# Patient Record
Sex: FEMALE | Race: BLACK OR AFRICAN AMERICAN | HISPANIC OR LATINO
[De-identification: names, ages, dates, MRNs, and addresses within clinical notes are randomized per-mention and may not be internally consistent; named-entity substitution may affect disease eponyms.]

---

## 2017-01-06 ENCOUNTER — OTHER (OUTPATIENT)
Age: 32
End: 2017-01-06

## 2017-01-06 ENCOUNTER — APPOINTMENT (OUTPATIENT)
Dept: OBGYN | Facility: CLINIC | Age: 32
End: 2017-01-06

## 2017-01-06 VITALS
SYSTOLIC BLOOD PRESSURE: 116 MMHG | WEIGHT: 194 LBS | BODY MASS INDEX: 32.32 KG/M2 | DIASTOLIC BLOOD PRESSURE: 77 MMHG | HEART RATE: 84 BPM | HEIGHT: 65 IN

## 2017-01-06 DIAGNOSIS — Z34.82 ENCOUNTER FOR SUPERVISION OF OTHER NORMAL PREGNANCY, SECOND TRIMESTER: ICD-10-CM

## 2017-01-06 DIAGNOSIS — Z34.80 ENCOUNTER FOR SUPERVISION OF OTHER NORMAL PREGNANCY, UNSPECIFIED TRIMESTER: ICD-10-CM

## 2017-01-09 ENCOUNTER — APPOINTMENT (OUTPATIENT)
Dept: OBGYN | Facility: CLINIC | Age: 32
End: 2017-01-09

## 2017-01-18 ENCOUNTER — OTHER (OUTPATIENT)
Age: 32
End: 2017-01-18

## 2017-01-18 DIAGNOSIS — A59.01 TRICHOMONAL VULVOVAGINITIS: ICD-10-CM

## 2017-01-18 DIAGNOSIS — Z30.9 ENCOUNTER FOR CONTRACEPTIVE MANAGEMENT, UNSPECIFIED: ICD-10-CM

## 2017-01-18 LAB
C TRACH DNA SPEC QL NAA+PROBE: NORMAL
C TRACH RRNA SPEC QL NAA+PROBE: NORMAL
CANDIDA VAG CYTO: NOT DETECTED
G VAGINALIS+PREV SP MTYP VAG QL MICRO: DETECTED
N GONORRHOEA DNA SPEC QL NAA+PROBE: NORMAL
N GONORRHOEA RRNA SPEC QL NAA+PROBE: NORMAL
SOURCE AMPLIFICATION: NORMAL
T VAGINALIS VAG QL WET PREP: DETECTED

## 2017-01-24 ENCOUNTER — TRANSCRIPTION ENCOUNTER (OUTPATIENT)
Age: 32
End: 2017-01-24

## 2017-01-26 ENCOUNTER — TRANSCRIPTION ENCOUNTER (OUTPATIENT)
Age: 32
End: 2017-01-26

## 2017-01-27 ENCOUNTER — APPOINTMENT (OUTPATIENT)
Dept: OBGYN | Facility: CLINIC | Age: 32
End: 2017-01-27

## 2017-01-27 ENCOUNTER — TRANSCRIPTION ENCOUNTER (OUTPATIENT)
Age: 32
End: 2017-01-27

## 2017-03-01 ENCOUNTER — APPOINTMENT (OUTPATIENT)
Dept: OBGYN | Facility: CLINIC | Age: 32
End: 2017-03-01

## 2017-03-08 ENCOUNTER — APPOINTMENT (OUTPATIENT)
Age: 32
End: 2017-03-08

## 2017-10-30 ENCOUNTER — EMERGENCY (EMERGENCY)
Facility: HOSPITAL | Age: 32
LOS: 1 days | Discharge: HOME | End: 2017-10-30
Attending: EMERGENCY MEDICINE | Admitting: EMERGENCY MEDICINE
Payer: SELF-PAY

## 2017-10-30 VITALS
SYSTOLIC BLOOD PRESSURE: 119 MMHG | HEART RATE: 66 BPM | RESPIRATION RATE: 20 BRPM | TEMPERATURE: 99 F | OXYGEN SATURATION: 99 % | DIASTOLIC BLOOD PRESSURE: 83 MMHG

## 2017-10-30 VITALS — OXYGEN SATURATION: 100 % | DIASTOLIC BLOOD PRESSURE: 63 MMHG | HEART RATE: 64 BPM | SYSTOLIC BLOOD PRESSURE: 108 MMHG

## 2017-10-30 DIAGNOSIS — Z98.890 OTHER SPECIFIED POSTPROCEDURAL STATES: Chronic | ICD-10-CM

## 2017-10-30 DIAGNOSIS — O34.219 MATERNAL CARE FOR UNSPECIFIED TYPE SCAR FROM PREVIOUS CESAREAN DELIVERY: Chronic | ICD-10-CM

## 2017-10-30 LAB
ALBUMIN SERPL ELPH-MCNC: 4.1 G/DL — SIGNIFICANT CHANGE UP (ref 3.3–5)
ALP SERPL-CCNC: 79 U/L — SIGNIFICANT CHANGE UP (ref 40–120)
ALT FLD-CCNC: 18 U/L — SIGNIFICANT CHANGE UP (ref 4–33)
APPEARANCE UR: CLEAR — SIGNIFICANT CHANGE UP
AST SERPL-CCNC: 16 U/L — SIGNIFICANT CHANGE UP (ref 4–32)
BASOPHILS # BLD AUTO: 0.03 K/UL — SIGNIFICANT CHANGE UP (ref 0–0.2)
BASOPHILS NFR BLD AUTO: 0.4 % — SIGNIFICANT CHANGE UP (ref 0–2)
BILIRUB SERPL-MCNC: 0.3 MG/DL — SIGNIFICANT CHANGE UP (ref 0.2–1.2)
BILIRUB UR-MCNC: NEGATIVE — SIGNIFICANT CHANGE UP
BLOOD UR QL VISUAL: NEGATIVE — SIGNIFICANT CHANGE UP
BUN SERPL-MCNC: 11 MG/DL — SIGNIFICANT CHANGE UP (ref 7–23)
CALCIUM SERPL-MCNC: 8.9 MG/DL — SIGNIFICANT CHANGE UP (ref 8.4–10.5)
CHLORIDE SERPL-SCNC: 104 MMOL/L — SIGNIFICANT CHANGE UP (ref 98–107)
CO2 SERPL-SCNC: 25 MMOL/L — SIGNIFICANT CHANGE UP (ref 22–31)
COLOR SPEC: SIGNIFICANT CHANGE UP
CREAT SERPL-MCNC: 0.94 MG/DL — SIGNIFICANT CHANGE UP (ref 0.5–1.3)
EOSINOPHIL # BLD AUTO: 0.05 K/UL — SIGNIFICANT CHANGE UP (ref 0–0.5)
EOSINOPHIL NFR BLD AUTO: 0.6 % — SIGNIFICANT CHANGE UP (ref 0–6)
GLUCOSE SERPL-MCNC: 104 MG/DL — HIGH (ref 70–99)
GLUCOSE UR-MCNC: NEGATIVE — SIGNIFICANT CHANGE UP
HCT VFR BLD CALC: 36.7 % — SIGNIFICANT CHANGE UP (ref 34.5–45)
HGB BLD-MCNC: 12 G/DL — SIGNIFICANT CHANGE UP (ref 11.5–15.5)
IMM GRANULOCYTES # BLD AUTO: 0.03 # — SIGNIFICANT CHANGE UP
IMM GRANULOCYTES NFR BLD AUTO: 0.4 % — SIGNIFICANT CHANGE UP (ref 0–1.5)
KETONES UR-MCNC: NEGATIVE — SIGNIFICANT CHANGE UP
LEUKOCYTE ESTERASE UR-ACNC: HIGH
LIDOCAIN IGE QN: 22 U/L — SIGNIFICANT CHANGE UP (ref 7–60)
LYMPHOCYTES # BLD AUTO: 1.24 K/UL — SIGNIFICANT CHANGE UP (ref 1–3.3)
LYMPHOCYTES # BLD AUTO: 14.6 % — SIGNIFICANT CHANGE UP (ref 13–44)
MCHC RBC-ENTMCNC: 29.6 PG — SIGNIFICANT CHANGE UP (ref 27–34)
MCHC RBC-ENTMCNC: 32.7 % — SIGNIFICANT CHANGE UP (ref 32–36)
MCV RBC AUTO: 90.4 FL — SIGNIFICANT CHANGE UP (ref 80–100)
MONOCYTES # BLD AUTO: 0.35 K/UL — SIGNIFICANT CHANGE UP (ref 0–0.9)
MONOCYTES NFR BLD AUTO: 4.1 % — SIGNIFICANT CHANGE UP (ref 2–14)
MUCOUS THREADS # UR AUTO: SIGNIFICANT CHANGE UP
NEUTROPHILS # BLD AUTO: 6.77 K/UL — SIGNIFICANT CHANGE UP (ref 1.8–7.4)
NEUTROPHILS NFR BLD AUTO: 79.9 % — HIGH (ref 43–77)
NITRITE UR-MCNC: NEGATIVE — SIGNIFICANT CHANGE UP
NRBC # FLD: 0 — SIGNIFICANT CHANGE UP
PH UR: 8 — SIGNIFICANT CHANGE UP (ref 4.6–8)
PLATELET # BLD AUTO: 202 K/UL — SIGNIFICANT CHANGE UP (ref 150–400)
PMV BLD: 10.4 FL — SIGNIFICANT CHANGE UP (ref 7–13)
POTASSIUM SERPL-MCNC: 4.5 MMOL/L — SIGNIFICANT CHANGE UP (ref 3.5–5.3)
POTASSIUM SERPL-SCNC: 4.5 MMOL/L — SIGNIFICANT CHANGE UP (ref 3.5–5.3)
PROT SERPL-MCNC: 7.5 G/DL — SIGNIFICANT CHANGE UP (ref 6–8.3)
PROT UR-MCNC: NEGATIVE — SIGNIFICANT CHANGE UP
RBC # BLD: 4.06 M/UL — SIGNIFICANT CHANGE UP (ref 3.8–5.2)
RBC # FLD: 12.2 % — SIGNIFICANT CHANGE UP (ref 10.3–14.5)
RBC CASTS # UR COMP ASSIST: SIGNIFICANT CHANGE UP (ref 0–?)
SODIUM SERPL-SCNC: 139 MMOL/L — SIGNIFICANT CHANGE UP (ref 135–145)
SP GR SPEC: 1.02 — SIGNIFICANT CHANGE UP (ref 1–1.03)
SQUAMOUS # UR AUTO: SIGNIFICANT CHANGE UP
UROBILINOGEN FLD QL: NORMAL E.U. — SIGNIFICANT CHANGE UP (ref 0.1–0.2)
WBC # BLD: 8.47 K/UL — SIGNIFICANT CHANGE UP (ref 3.8–10.5)
WBC # FLD AUTO: 8.47 K/UL — SIGNIFICANT CHANGE UP (ref 3.8–10.5)
WBC UR QL: HIGH (ref 0–?)

## 2017-10-30 PROCEDURE — 99284 EMERGENCY DEPT VISIT MOD MDM: CPT

## 2017-10-30 RX ORDER — ONDANSETRON 8 MG/1
4 TABLET, FILM COATED ORAL ONCE
Qty: 0 | Refills: 0 | Status: COMPLETED | OUTPATIENT
Start: 2017-10-30 | End: 2017-10-30

## 2017-10-30 RX ORDER — FAMOTIDINE 10 MG/ML
20 INJECTION INTRAVENOUS ONCE
Qty: 0 | Refills: 0 | Status: COMPLETED | OUTPATIENT
Start: 2017-10-30 | End: 2017-10-30

## 2017-10-30 RX ORDER — SODIUM CHLORIDE 9 MG/ML
2000 INJECTION INTRAMUSCULAR; INTRAVENOUS; SUBCUTANEOUS ONCE
Qty: 0 | Refills: 0 | Status: COMPLETED | OUTPATIENT
Start: 2017-10-30 | End: 2017-10-30

## 2017-10-30 RX ORDER — ACETAMINOPHEN 500 MG
650 TABLET ORAL ONCE
Qty: 0 | Refills: 0 | Status: COMPLETED | OUTPATIENT
Start: 2017-10-30 | End: 2017-10-30

## 2017-10-30 RX ADMIN — SODIUM CHLORIDE 2000 MILLILITER(S): 9 INJECTION INTRAMUSCULAR; INTRAVENOUS; SUBCUTANEOUS at 10:00

## 2017-10-30 RX ADMIN — FAMOTIDINE 20 MILLIGRAM(S): 10 INJECTION INTRAVENOUS at 09:47

## 2017-10-30 RX ADMIN — ONDANSETRON 4 MILLIGRAM(S): 8 TABLET, FILM COATED ORAL at 09:47

## 2017-10-30 RX ADMIN — Medication 650 MILLIGRAM(S): at 10:00

## 2017-10-30 NOTE — ED ADULT TRIAGE NOTE - CHIEF COMPLAINT QUOTE
pt states "I have sharp pain across my abdomen it started suddenly this morning" c/o feeling nauseous

## 2017-10-30 NOTE — ED PROVIDER NOTE - OBJECTIVE STATEMENT
31 y/o F w/ no significant PMhx and PSHx of , presents to the ED c/o infraumbilical pain since 2:30am w/ diffuse abd cramping associated w/ nausea. No vomiting. Pt also reports 1 episode of watery diarrhea. Pain is unrelated to meals. Pt states she ate chinese food last night, but daughter had same food and is asymptomatic. Denies fever, chills, dysuria, hematuria, recent travel, sick contacts, recent antibiotic use or any other complaints.

## 2017-10-30 NOTE — ED PROVIDER NOTE - MEDICAL DECISION MAKING DETAILS
33 y/o F w/ sx likely secondary to chinese food intake yesterday. Will order labs, IV fluids, repeat abd exam and likely dc home w/ APPY instructions.

## 2017-10-31 LAB — SPECIMEN SOURCE: SIGNIFICANT CHANGE UP

## 2017-11-01 LAB
-  AMIKACIN: SIGNIFICANT CHANGE UP
-  AMPICILLIN/SULBACTAM: SIGNIFICANT CHANGE UP
-  AMPICILLIN: SIGNIFICANT CHANGE UP
-  AZTREONAM: SIGNIFICANT CHANGE UP
-  CEFAZOLIN: SIGNIFICANT CHANGE UP
-  CEFEPIME: SIGNIFICANT CHANGE UP
-  CEFOXITIN: SIGNIFICANT CHANGE UP
-  CEFTAZIDIME: SIGNIFICANT CHANGE UP
-  CEFTRIAXONE: SIGNIFICANT CHANGE UP
-  CIPROFLOXACIN: SIGNIFICANT CHANGE UP
-  ERTAPENEM: SIGNIFICANT CHANGE UP
-  GENTAMICIN: SIGNIFICANT CHANGE UP
-  IMIPENEM: SIGNIFICANT CHANGE UP
-  LEVOFLOXACIN: SIGNIFICANT CHANGE UP
-  MEROPENEM: SIGNIFICANT CHANGE UP
-  NITROFURANTOIN: SIGNIFICANT CHANGE UP
-  PIPERACILLIN/TAZOBACTAM: SIGNIFICANT CHANGE UP
-  TIGECYCLINE: SIGNIFICANT CHANGE UP
-  TOBRAMYCIN: SIGNIFICANT CHANGE UP
-  TRIMETHOPRIM/SULFAMETHOXAZOLE: SIGNIFICANT CHANGE UP
BACTERIA UR CULT: SIGNIFICANT CHANGE UP
METHOD TYPE: SIGNIFICANT CHANGE UP
ORGANISM # SPEC MICROSCOPIC CNT: SIGNIFICANT CHANGE UP
ORGANISM # SPEC MICROSCOPIC CNT: SIGNIFICANT CHANGE UP

## 2017-11-01 NOTE — ED POST DISCHARGE NOTE - RESULT SUMMARY
UCX: 1. E, coli > 100,000 prelim. UCX 2. Gram neg rods 10,000-49,000CFU/ml prelim. No antibiotic listed in ED provider note or prescription writer at time of discharge. Admin P.A. to follow results to final.

## 2017-11-02 RX ORDER — NITROFURANTOIN MACROCRYSTAL 50 MG
1 CAPSULE ORAL
Qty: 14 | Refills: 0 | OUTPATIENT
Start: 2017-11-02 | End: 2017-11-09

## 2018-10-16 ENCOUNTER — APPOINTMENT (OUTPATIENT)
Dept: SURGERY | Facility: CLINIC | Age: 33
End: 2018-10-16
Payer: COMMERCIAL

## 2018-10-16 VITALS
TEMPERATURE: 98.4 F | OXYGEN SATURATION: 98 % | BODY MASS INDEX: 31.65 KG/M2 | SYSTOLIC BLOOD PRESSURE: 132 MMHG | DIASTOLIC BLOOD PRESSURE: 76 MMHG | HEIGHT: 65 IN | HEART RATE: 70 BPM | WEIGHT: 190 LBS

## 2018-10-16 PROCEDURE — 99204 OFFICE O/P NEW MOD 45 MIN: CPT

## 2018-11-05 ENCOUNTER — OUTPATIENT (OUTPATIENT)
Dept: OUTPATIENT SERVICES | Facility: HOSPITAL | Age: 33
LOS: 1 days | End: 2018-11-05
Payer: COMMERCIAL

## 2018-11-05 VITALS
HEART RATE: 77 BPM | DIASTOLIC BLOOD PRESSURE: 67 MMHG | WEIGHT: 195.11 LBS | HEIGHT: 65 IN | TEMPERATURE: 99 F | OXYGEN SATURATION: 98 % | RESPIRATION RATE: 18 BRPM | SYSTOLIC BLOOD PRESSURE: 105 MMHG

## 2018-11-05 DIAGNOSIS — K43.2 INCISIONAL HERNIA WITHOUT OBSTRUCTION OR GANGRENE: ICD-10-CM

## 2018-11-05 DIAGNOSIS — Z98.890 OTHER SPECIFIED POSTPROCEDURAL STATES: Chronic | ICD-10-CM

## 2018-11-05 DIAGNOSIS — O34.219 MATERNAL CARE FOR UNSPECIFIED TYPE SCAR FROM PREVIOUS CESAREAN DELIVERY: Chronic | ICD-10-CM

## 2018-11-05 DIAGNOSIS — L98.9 DISORDER OF THE SKIN AND SUBCUTANEOUS TISSUE, UNSPECIFIED: ICD-10-CM

## 2018-11-05 DIAGNOSIS — Z01.818 ENCOUNTER FOR OTHER PREPROCEDURAL EXAMINATION: ICD-10-CM

## 2018-11-05 LAB
ANION GAP SERPL CALC-SCNC: 6 MMOL/L — SIGNIFICANT CHANGE UP (ref 5–17)
APTT BLD: 30.4 SEC — SIGNIFICANT CHANGE UP (ref 27.5–36.3)
BUN SERPL-MCNC: 10 MG/DL — SIGNIFICANT CHANGE UP (ref 7–18)
CALCIUM SERPL-MCNC: 8.9 MG/DL — SIGNIFICANT CHANGE UP (ref 8.4–10.5)
CHLORIDE SERPL-SCNC: 106 MMOL/L — SIGNIFICANT CHANGE UP (ref 96–108)
CO2 SERPL-SCNC: 24 MMOL/L — SIGNIFICANT CHANGE UP (ref 22–31)
CREAT SERPL-MCNC: 0.85 MG/DL — SIGNIFICANT CHANGE UP (ref 0.5–1.3)
GLUCOSE SERPL-MCNC: 95 MG/DL — SIGNIFICANT CHANGE UP (ref 70–99)
HCG SERPL-ACNC: <1 MIU/ML — SIGNIFICANT CHANGE UP
HCT VFR BLD CALC: 37.8 % — SIGNIFICANT CHANGE UP (ref 34.5–45)
HGB BLD-MCNC: 12.4 G/DL — SIGNIFICANT CHANGE UP (ref 11.5–15.5)
INR BLD: 1.08 RATIO — SIGNIFICANT CHANGE UP (ref 0.88–1.16)
MCHC RBC-ENTMCNC: 29.1 PG — SIGNIFICANT CHANGE UP (ref 27–34)
MCHC RBC-ENTMCNC: 32.7 GM/DL — SIGNIFICANT CHANGE UP (ref 32–36)
MCV RBC AUTO: 89 FL — SIGNIFICANT CHANGE UP (ref 80–100)
PLATELET # BLD AUTO: 217 K/UL — SIGNIFICANT CHANGE UP (ref 150–400)
POTASSIUM SERPL-MCNC: 3.9 MMOL/L — SIGNIFICANT CHANGE UP (ref 3.5–5.3)
POTASSIUM SERPL-SCNC: 3.9 MMOL/L — SIGNIFICANT CHANGE UP (ref 3.5–5.3)
PROTHROM AB SERPL-ACNC: 12 SEC — SIGNIFICANT CHANGE UP (ref 10–12.9)
RBC # BLD: 4.25 M/UL — SIGNIFICANT CHANGE UP (ref 3.8–5.2)
RBC # FLD: 12.4 % — SIGNIFICANT CHANGE UP (ref 10.3–14.5)
SODIUM SERPL-SCNC: 136 MMOL/L — SIGNIFICANT CHANGE UP (ref 135–145)
WBC # BLD: 6.5 K/UL — SIGNIFICANT CHANGE UP (ref 3.8–10.5)
WBC # FLD AUTO: 6.5 K/UL — SIGNIFICANT CHANGE UP (ref 3.8–10.5)

## 2018-11-05 PROCEDURE — G0463: CPT

## 2018-11-05 PROCEDURE — 85730 THROMBOPLASTIN TIME PARTIAL: CPT

## 2018-11-05 PROCEDURE — 86901 BLOOD TYPING SEROLOGIC RH(D): CPT

## 2018-11-05 PROCEDURE — 86850 RBC ANTIBODY SCREEN: CPT

## 2018-11-05 PROCEDURE — 86900 BLOOD TYPING SEROLOGIC ABO: CPT

## 2018-11-05 PROCEDURE — 80048 BASIC METABOLIC PNL TOTAL CA: CPT

## 2018-11-05 PROCEDURE — 85610 PROTHROMBIN TIME: CPT

## 2018-11-05 PROCEDURE — 84702 CHORIONIC GONADOTROPIN TEST: CPT

## 2018-11-05 PROCEDURE — 85027 COMPLETE CBC AUTOMATED: CPT

## 2018-11-05 NOTE — H&P PST ADULT - HISTORY OF PRESENT ILLNESS
33year old female with pmhx of acid reflux, incisional hernia and constipation presents today with c/o abdominal pain x weeks. Patient is here today for presurgical testing for scheduled repair of incisional hernia and removal of excess skin on 11/8/18

## 2018-11-05 NOTE — H&P PST ADULT - NSANTHOSAYNRD_GEN_A_CORE
No. KENYON screening performed.  STOP BANG Legend: 0-2 = LOW Risk; 3-4 = INTERMEDIATE Risk; 5-8 = HIGH Risk

## 2018-11-05 NOTE — H&P PST ADULT - NEGATIVE FEMALE-SPECIFIC SYMPTOMS
no dysmenorrhea/no amenorrhea/no abnormal vaginal bleeding/no pelvic pain/no irregular menses/no vaginal discharge/no menorrhagia/no spotting

## 2018-11-13 PROBLEM — E66.9 OBESITY, UNSPECIFIED: Chronic | Status: ACTIVE | Noted: 2018-11-05

## 2018-11-13 PROBLEM — K42.9 UMBILICAL HERNIA WITHOUT OBSTRUCTION OR GANGRENE: Chronic | Status: ACTIVE | Noted: 2018-11-05

## 2018-11-22 ENCOUNTER — TRANSCRIPTION ENCOUNTER (OUTPATIENT)
Age: 33
End: 2018-11-22

## 2018-11-23 ENCOUNTER — OUTPATIENT (OUTPATIENT)
Dept: OUTPATIENT SERVICES | Facility: HOSPITAL | Age: 33
LOS: 1 days | End: 2018-11-23
Payer: COMMERCIAL

## 2018-11-23 ENCOUNTER — RESULT REVIEW (OUTPATIENT)
Age: 33
End: 2018-11-23

## 2018-11-23 VITALS
OXYGEN SATURATION: 98 % | HEART RATE: 78 BPM | RESPIRATION RATE: 16 BRPM | HEIGHT: 65 IN | SYSTOLIC BLOOD PRESSURE: 107 MMHG | TEMPERATURE: 98 F | WEIGHT: 195.11 LBS | DIASTOLIC BLOOD PRESSURE: 71 MMHG

## 2018-11-23 VITALS
SYSTOLIC BLOOD PRESSURE: 106 MMHG | OXYGEN SATURATION: 97 % | RESPIRATION RATE: 14 BRPM | HEART RATE: 90 BPM | DIASTOLIC BLOOD PRESSURE: 75 MMHG | TEMPERATURE: 98 F

## 2018-11-23 DIAGNOSIS — Z98.890 OTHER SPECIFIED POSTPROCEDURAL STATES: Chronic | ICD-10-CM

## 2018-11-23 DIAGNOSIS — K43.2 INCISIONAL HERNIA WITHOUT OBSTRUCTION OR GANGRENE: ICD-10-CM

## 2018-11-23 DIAGNOSIS — O34.219 MATERNAL CARE FOR UNSPECIFIED TYPE SCAR FROM PREVIOUS CESAREAN DELIVERY: Chronic | ICD-10-CM

## 2018-11-23 DIAGNOSIS — L98.9 DISORDER OF THE SKIN AND SUBCUTANEOUS TISSUE, UNSPECIFIED: ICD-10-CM

## 2018-11-23 DIAGNOSIS — Z01.818 ENCOUNTER FOR OTHER PREPROCEDURAL EXAMINATION: ICD-10-CM

## 2018-11-23 LAB — HCG UR QL: NEGATIVE — SIGNIFICANT CHANGE UP

## 2018-11-23 PROCEDURE — 49560: CPT

## 2018-11-23 PROCEDURE — 86900 BLOOD TYPING SEROLOGIC ABO: CPT

## 2018-11-23 PROCEDURE — 81025 URINE PREGNANCY TEST: CPT

## 2018-11-23 PROCEDURE — 49560: CPT | Mod: AS

## 2018-11-23 PROCEDURE — 88302 TISSUE EXAM BY PATHOLOGIST: CPT

## 2018-11-23 PROCEDURE — 15830 EXC EXCESSIVE SKIN ABDOMEN: CPT | Mod: AS,59

## 2018-11-23 PROCEDURE — 15830 EXC EXCESSIVE SKIN ABDOMEN: CPT | Mod: XS

## 2018-11-23 PROCEDURE — 88302 TISSUE EXAM BY PATHOLOGIST: CPT | Mod: 26

## 2018-11-23 PROCEDURE — 15830 EXC EXCESSIVE SKIN ABDOMEN: CPT | Mod: 59

## 2018-11-23 PROCEDURE — 86901 BLOOD TYPING SEROLOGIC RH(D): CPT

## 2018-11-23 PROCEDURE — 86850 RBC ANTIBODY SCREEN: CPT

## 2018-11-23 RX ORDER — HYDROMORPHONE HYDROCHLORIDE 2 MG/ML
0.5 INJECTION INTRAMUSCULAR; INTRAVENOUS; SUBCUTANEOUS
Qty: 0 | Refills: 0 | Status: DISCONTINUED | OUTPATIENT
Start: 2018-11-23 | End: 2018-11-23

## 2018-11-23 RX ORDER — OXYCODONE AND ACETAMINOPHEN 5; 325 MG/1; MG/1
1 TABLET ORAL EVERY 6 HOURS
Qty: 0 | Refills: 0 | Status: DISCONTINUED | OUTPATIENT
Start: 2018-11-23 | End: 2018-11-23

## 2018-11-23 RX ORDER — SODIUM CHLORIDE 9 MG/ML
3 INJECTION INTRAMUSCULAR; INTRAVENOUS; SUBCUTANEOUS EVERY 8 HOURS
Qty: 0 | Refills: 0 | Status: DISCONTINUED | OUTPATIENT
Start: 2018-11-23 | End: 2018-11-23

## 2018-11-23 RX ORDER — SODIUM CHLORIDE 9 MG/ML
1000 INJECTION, SOLUTION INTRAVENOUS
Qty: 0 | Refills: 0 | Status: DISCONTINUED | OUTPATIENT
Start: 2018-11-23 | End: 2018-12-01

## 2018-11-23 RX ORDER — SODIUM CHLORIDE 9 MG/ML
1000 INJECTION, SOLUTION INTRAVENOUS
Qty: 0 | Refills: 0 | Status: DISCONTINUED | OUTPATIENT
Start: 2018-11-23 | End: 2018-11-26

## 2018-11-23 RX ADMIN — SODIUM CHLORIDE 3 MILLILITER(S): 9 INJECTION INTRAMUSCULAR; INTRAVENOUS; SUBCUTANEOUS at 09:08

## 2018-11-23 RX ADMIN — HYDROMORPHONE HYDROCHLORIDE 0.5 MILLIGRAM(S): 2 INJECTION INTRAMUSCULAR; INTRAVENOUS; SUBCUTANEOUS at 16:52

## 2018-11-23 RX ADMIN — HYDROMORPHONE HYDROCHLORIDE 0.5 MILLIGRAM(S): 2 INJECTION INTRAMUSCULAR; INTRAVENOUS; SUBCUTANEOUS at 17:23

## 2018-11-23 RX ADMIN — HYDROMORPHONE HYDROCHLORIDE 0.5 MILLIGRAM(S): 2 INJECTION INTRAMUSCULAR; INTRAVENOUS; SUBCUTANEOUS at 16:47

## 2018-11-23 NOTE — ASU DISCHARGE PLAN (ADULT/PEDIATRIC). - MEDICATION SUMMARY - MEDICATIONS TO TAKE
I will START or STAY ON the medications listed below when I get home from the hospital:    oxyCODONE-acetaminophen 5 mg-325 mg oral tablet  -- 1 tab(s) by mouth every 6 hours, As needed, Moderate Pain (4 - 6) MDD:4  -- Indication: For pain

## 2018-11-23 NOTE — BRIEF OPERATIVE NOTE - PROCEDURE
<<-----Click on this checkbox to enter Procedure Surgical removal of excess skin of abdomen  11/23/2018    Active  BA  Herniorrhaphy, incisional  11/23/2018    Active  MOZGA

## 2018-11-23 NOTE — BRIEF OPERATIVE NOTE - POST-OP DX
Disorder of skin or subcutaneous tissue  11/23/2018    Diamond Hernandez  Incisional hernia without obstruction or gangrene  11/23/2018    Active  Diamond Ray

## 2018-11-26 PROBLEM — Z82.49 FAMILY HISTORY OF HYPERTENSION: Status: ACTIVE | Noted: 2018-10-16

## 2018-11-26 PROBLEM — Z83.3 FAMILY HISTORY OF DIABETES MELLITUS: Status: ACTIVE | Noted: 2018-10-16

## 2018-11-26 PROBLEM — Z78.9 SOCIAL ALCOHOL USE: Status: ACTIVE | Noted: 2018-10-16

## 2018-11-27 ENCOUNTER — APPOINTMENT (OUTPATIENT)
Dept: SURGERY | Facility: CLINIC | Age: 33
End: 2018-11-27
Payer: COMMERCIAL

## 2018-11-27 DIAGNOSIS — Z82.49 FAMILY HISTORY OF ISCHEMIC HEART DISEASE AND OTHER DISEASES OF THE CIRCULATORY SYSTEM: ICD-10-CM

## 2018-11-27 DIAGNOSIS — Z83.3 FAMILY HISTORY OF DIABETES MELLITUS: ICD-10-CM

## 2018-11-27 DIAGNOSIS — Z78.9 OTHER SPECIFIED HEALTH STATUS: ICD-10-CM

## 2018-11-27 PROCEDURE — 99024 POSTOP FOLLOW-UP VISIT: CPT

## 2018-11-29 LAB — SURGICAL PATHOLOGY STUDY: SIGNIFICANT CHANGE UP

## 2018-12-04 DIAGNOSIS — G89.18 OTHER ACUTE POSTPROCEDURAL PAIN: ICD-10-CM

## 2018-12-11 ENCOUNTER — APPOINTMENT (OUTPATIENT)
Dept: SURGERY | Facility: CLINIC | Age: 33
End: 2018-12-11
Payer: COMMERCIAL

## 2018-12-11 DIAGNOSIS — K42.9 UMBILICAL HERNIA W/OUT OBSTRUCTION OR GANGRENE: ICD-10-CM

## 2018-12-11 PROCEDURE — 99024 POSTOP FOLLOW-UP VISIT: CPT

## 2019-01-08 ENCOUNTER — APPOINTMENT (OUTPATIENT)
Dept: SURGERY | Facility: CLINIC | Age: 34
End: 2019-01-08
Payer: COMMERCIAL

## 2019-01-08 DIAGNOSIS — K43.2 INCISIONAL HERNIA W/OUT OBSTRUCTION OR GANGRENE: ICD-10-CM

## 2019-01-08 PROCEDURE — 99024 POSTOP FOLLOW-UP VISIT: CPT

## 2019-03-10 ENCOUNTER — EMERGENCY (EMERGENCY)
Facility: HOSPITAL | Age: 34
LOS: 1 days | Discharge: HOME | End: 2019-03-10
Attending: EMERGENCY MEDICINE | Admitting: EMERGENCY MEDICINE
Payer: COMMERCIAL

## 2019-03-10 VITALS
SYSTOLIC BLOOD PRESSURE: 116 MMHG | DIASTOLIC BLOOD PRESSURE: 76 MMHG | RESPIRATION RATE: 16 BRPM | HEART RATE: 81 BPM | OXYGEN SATURATION: 100 % | TEMPERATURE: 98 F

## 2019-03-10 DIAGNOSIS — O34.219 MATERNAL CARE FOR UNSPECIFIED TYPE SCAR FROM PREVIOUS CESAREAN DELIVERY: Chronic | ICD-10-CM

## 2019-03-10 DIAGNOSIS — Z98.890 OTHER SPECIFIED POSTPROCEDURAL STATES: Chronic | ICD-10-CM

## 2019-03-10 LAB
BASOPHILS # BLD AUTO: 0.03 K/UL — SIGNIFICANT CHANGE UP (ref 0–0.2)
BASOPHILS NFR BLD AUTO: 0.3 % — SIGNIFICANT CHANGE UP (ref 0–2)
EOSINOPHIL # BLD AUTO: 0.17 K/UL — SIGNIFICANT CHANGE UP (ref 0–0.5)
EOSINOPHIL NFR BLD AUTO: 1.8 % — SIGNIFICANT CHANGE UP (ref 0–6)
HCT VFR BLD CALC: 36.3 % — SIGNIFICANT CHANGE UP (ref 34.5–45)
HGB BLD-MCNC: 11.6 G/DL — SIGNIFICANT CHANGE UP (ref 11.5–15.5)
IMM GRANULOCYTES NFR BLD AUTO: 0.2 % — SIGNIFICANT CHANGE UP (ref 0–1.5)
LYMPHOCYTES # BLD AUTO: 2.84 K/UL — SIGNIFICANT CHANGE UP (ref 1–3.3)
LYMPHOCYTES # BLD AUTO: 30 % — SIGNIFICANT CHANGE UP (ref 13–44)
MCHC RBC-ENTMCNC: 28.3 PG — SIGNIFICANT CHANGE UP (ref 27–34)
MCHC RBC-ENTMCNC: 32 % — SIGNIFICANT CHANGE UP (ref 32–36)
MCV RBC AUTO: 88.5 FL — SIGNIFICANT CHANGE UP (ref 80–100)
MONOCYTES # BLD AUTO: 0.64 K/UL — SIGNIFICANT CHANGE UP (ref 0–0.9)
MONOCYTES NFR BLD AUTO: 6.8 % — SIGNIFICANT CHANGE UP (ref 2–14)
NEUTROPHILS # BLD AUTO: 5.76 K/UL — SIGNIFICANT CHANGE UP (ref 1.8–7.4)
NEUTROPHILS NFR BLD AUTO: 60.9 % — SIGNIFICANT CHANGE UP (ref 43–77)
NRBC # FLD: 0 K/UL — LOW (ref 25–125)
OB PNL STL: POSITIVE — SIGNIFICANT CHANGE UP
PLATELET # BLD AUTO: 217 K/UL — SIGNIFICANT CHANGE UP (ref 150–400)
PMV BLD: 10.3 FL — SIGNIFICANT CHANGE UP (ref 7–13)
RBC # BLD: 4.1 M/UL — SIGNIFICANT CHANGE UP (ref 3.8–5.2)
RBC # FLD: 13.2 % — SIGNIFICANT CHANGE UP (ref 10.3–14.5)
WBC # BLD: 9.46 K/UL — SIGNIFICANT CHANGE UP (ref 3.8–10.5)
WBC # FLD AUTO: 9.46 K/UL — SIGNIFICANT CHANGE UP (ref 3.8–10.5)

## 2019-03-10 PROCEDURE — 99283 EMERGENCY DEPT VISIT LOW MDM: CPT

## 2019-03-10 NOTE — ED PROVIDER NOTE - OBJECTIVE STATEMENT
32 yo with no sig PMH with 3 weeks of intermittent blood in stools.  No pain.  Described as a lot of blood in the toilet but the stool is reportedly brown.  No constipation.  Does have a history of hemorrhoids but this feels different.  Has felt tired over the last week but no SOB and not lightheaded.

## 2019-03-10 NOTE — ED PROVIDER NOTE - CLINICAL SUMMARY MEDICAL DECISION MAKING FREE TEXT BOX
pt with rectal bleeding. Based on exam unlikely to be melena.  Will get CBC to ensure not anemic.  Will transfuse if needed and refer to GI otherwise

## 2019-03-10 NOTE — ED PROVIDER NOTE - NSFOLLOWUPINSTRUCTIONS_ED_ALL_ED_FT
please follow up with a GI doctor from the attached list this week.  If your bleeding gets worse or you feel weaker or lightheaded or short of breath return to the ED sooner

## 2019-03-10 NOTE — ED PROVIDER NOTE - PHYSICAL EXAMINATION
Gen: Well appearing in NAD  Head: NC/AT  Neck: trachea midline  Resp:  No distress  Abd: soft NT ND - rectal with brown stool and some scant blood  Ext: no deformities  Neuro:  A&O appears non focal  Skin:  Warm and dry as visualized

## 2019-03-10 NOTE — ED ADULT TRIAGE NOTE - CHIEF COMPLAINT QUOTE
pt comes to ED for blood in stool x 2 weeks. pt denies being on blood thinners or needing a blood transfusion. pt was told to come to ER by PCP. pt VSS pt denies any PMHX

## 2019-04-03 ENCOUNTER — APPOINTMENT (OUTPATIENT)
Dept: GASTROENTEROLOGY | Facility: CLINIC | Age: 34
End: 2019-04-03
Payer: COMMERCIAL

## 2019-04-03 VITALS
HEIGHT: 65 IN | SYSTOLIC BLOOD PRESSURE: 120 MMHG | WEIGHT: 197 LBS | OXYGEN SATURATION: 98 % | HEART RATE: 75 BPM | BODY MASS INDEX: 32.82 KG/M2 | DIASTOLIC BLOOD PRESSURE: 80 MMHG | TEMPERATURE: 98.6 F

## 2019-04-03 DIAGNOSIS — B18.1 CHRONIC VIRAL HEPATITIS B W/OUT DELTA-AGENT: ICD-10-CM

## 2019-04-03 DIAGNOSIS — K62.5 HEMORRHAGE OF ANUS AND RECTUM: ICD-10-CM

## 2019-04-03 PROCEDURE — 99204 OFFICE O/P NEW MOD 45 MIN: CPT

## 2019-04-03 RX ORDER — IBUPROFEN 600 MG/1
600 TABLET, FILM COATED ORAL EVERY 6 HOURS
Qty: 120 | Refills: 0 | Status: DISCONTINUED | COMMUNITY
Start: 2018-12-04 | End: 2019-04-03

## 2019-04-03 RX ORDER — HYDROCORTISONE 25 MG/G
2.5 CREAM TOPICAL TWICE DAILY
Qty: 1 | Refills: 2 | Status: ACTIVE | COMMUNITY
Start: 2019-04-03 | End: 1900-01-01

## 2019-04-03 RX ORDER — METRONIDAZOLE 500 MG/1
500 TABLET ORAL
Qty: 4 | Refills: 0 | Status: DISCONTINUED | COMMUNITY
Start: 2017-01-18 | End: 2019-04-03

## 2019-04-03 RX ORDER — SODIUM SULFATE, POTASSIUM SULFATE, MAGNESIUM SULFATE 17.5; 3.13; 1.6 G/ML; G/ML; G/ML
17.5-3.13-1.6 SOLUTION, CONCENTRATE ORAL
Qty: 1 | Refills: 0 | Status: ACTIVE | COMMUNITY
Start: 2019-04-03 | End: 1900-01-01

## 2019-04-03 NOTE — ASSESSMENT
[FreeTextEntry1] : Patient with rectal bleeding likely secondary to internal hemorrhoids. She will be scheduled for colonoscopy. A hemorrhoidal cream will be sent to pharmacy.

## 2019-04-03 NOTE — HISTORY OF PRESENT ILLNESS
[FreeTextEntry1] : Patient is a 33-year-old female with two-month history of bright red blood per rectum. The blood is in the bowl, on the paper, and in the stool. She does complain of occasional constipation and moves her bowels about every other day. She was seen in the emergency room on 3/10. H./H. was 11.6/36.3, MCV 88, occult blood in the stool was positive.\par Patient has had these symptoms for the past 2 months and has noted the bleeding daily.\par She is status post incisional hernia repair in November.\par \par She is a chronic hepatitis B carrier with low levels of virus. She is on no therapy. LFTs and sonograms have been negative.

## 2019-05-02 ENCOUNTER — APPOINTMENT (OUTPATIENT)
Dept: GASTROENTEROLOGY | Facility: AMBULATORY MEDICAL SERVICES | Age: 34
End: 2019-05-02
Payer: COMMERCIAL

## 2019-05-02 PROCEDURE — 45378 DIAGNOSTIC COLONOSCOPY: CPT

## 2019-05-20 ENCOUNTER — EMERGENCY (EMERGENCY)
Facility: HOSPITAL | Age: 34
LOS: 1 days | Discharge: HOME | End: 2019-05-20
Admitting: EMERGENCY MEDICINE
Payer: COMMERCIAL

## 2019-05-20 VITALS
OXYGEN SATURATION: 98 % | RESPIRATION RATE: 17 BRPM | SYSTOLIC BLOOD PRESSURE: 104 MMHG | DIASTOLIC BLOOD PRESSURE: 66 MMHG | TEMPERATURE: 99 F | HEART RATE: 103 BPM

## 2019-05-20 VITALS
DIASTOLIC BLOOD PRESSURE: 62 MMHG | RESPIRATION RATE: 16 BRPM | HEART RATE: 84 BPM | SYSTOLIC BLOOD PRESSURE: 102 MMHG | OXYGEN SATURATION: 100 %

## 2019-05-20 DIAGNOSIS — O34.219 MATERNAL CARE FOR UNSPECIFIED TYPE SCAR FROM PREVIOUS CESAREAN DELIVERY: Chronic | ICD-10-CM

## 2019-05-20 DIAGNOSIS — Z98.890 OTHER SPECIFIED POSTPROCEDURAL STATES: Chronic | ICD-10-CM

## 2019-05-20 LAB
ALBUMIN SERPL ELPH-MCNC: 4.2 G/DL — SIGNIFICANT CHANGE UP (ref 3.3–5)
ALP SERPL-CCNC: 69 U/L — SIGNIFICANT CHANGE UP (ref 40–120)
ALT FLD-CCNC: 34 U/L — HIGH (ref 4–33)
ANION GAP SERPL CALC-SCNC: 13 MMO/L — SIGNIFICANT CHANGE UP (ref 7–14)
ANISOCYTOSIS BLD QL: SLIGHT — SIGNIFICANT CHANGE UP
AST SERPL-CCNC: 37 U/L — HIGH (ref 4–32)
BASOPHILS # BLD AUTO: 0.01 K/UL — SIGNIFICANT CHANGE UP (ref 0–0.2)
BASOPHILS NFR BLD AUTO: 0.5 % — SIGNIFICANT CHANGE UP (ref 0–2)
BASOPHILS NFR SPEC: 0.9 % — SIGNIFICANT CHANGE UP (ref 0–2)
BILIRUB SERPL-MCNC: 0.2 MG/DL — SIGNIFICANT CHANGE UP (ref 0.2–1.2)
BLASTS # FLD: 0 % — SIGNIFICANT CHANGE UP (ref 0–0)
BUN SERPL-MCNC: 12 MG/DL — SIGNIFICANT CHANGE UP (ref 7–23)
CALCIUM SERPL-MCNC: 9.4 MG/DL — SIGNIFICANT CHANGE UP (ref 8.4–10.5)
CHLORIDE SERPL-SCNC: 104 MMOL/L — SIGNIFICANT CHANGE UP (ref 98–107)
CO2 SERPL-SCNC: 23 MMOL/L — SIGNIFICANT CHANGE UP (ref 22–31)
CREAT SERPL-MCNC: 1.27 MG/DL — SIGNIFICANT CHANGE UP (ref 0.5–1.3)
EOSINOPHIL # BLD AUTO: 0.01 K/UL — SIGNIFICANT CHANGE UP (ref 0–0.5)
EOSINOPHIL NFR BLD AUTO: 0.5 % — SIGNIFICANT CHANGE UP (ref 0–6)
EOSINOPHIL NFR FLD: 0.9 % — SIGNIFICANT CHANGE UP (ref 0–6)
GIANT PLATELETS BLD QL SMEAR: PRESENT — SIGNIFICANT CHANGE UP
GLUCOSE SERPL-MCNC: 85 MG/DL — SIGNIFICANT CHANGE UP (ref 70–99)
HCT VFR BLD CALC: 39.9 % — SIGNIFICANT CHANGE UP (ref 34.5–45)
HGB BLD-MCNC: 12.5 G/DL — SIGNIFICANT CHANGE UP (ref 11.5–15.5)
IMM GRANULOCYTES NFR BLD AUTO: 0 % — SIGNIFICANT CHANGE UP (ref 0–1.5)
LIDOCAIN IGE QN: 26.4 U/L — SIGNIFICANT CHANGE UP (ref 7–60)
LYMPHOCYTES # BLD AUTO: 0.74 K/UL — LOW (ref 1–3.3)
LYMPHOCYTES # BLD AUTO: 35.7 % — SIGNIFICANT CHANGE UP (ref 13–44)
LYMPHOCYTES NFR SPEC AUTO: 32.4 % — SIGNIFICANT CHANGE UP (ref 13–44)
MCHC RBC-ENTMCNC: 28 PG — SIGNIFICANT CHANGE UP (ref 27–34)
MCHC RBC-ENTMCNC: 31.3 % — LOW (ref 32–36)
MCV RBC AUTO: 89.5 FL — SIGNIFICANT CHANGE UP (ref 80–100)
METAMYELOCYTES # FLD: 0 % — SIGNIFICANT CHANGE UP (ref 0–1)
MONOCYTES # BLD AUTO: 0.42 K/UL — SIGNIFICANT CHANGE UP (ref 0–0.9)
MONOCYTES NFR BLD AUTO: 20.3 % — HIGH (ref 2–14)
MONOCYTES NFR BLD: 15.8 % — HIGH (ref 2–9)
MYELOCYTES NFR BLD: 0 % — SIGNIFICANT CHANGE UP (ref 0–0)
NEUTROPHIL AB SER-ACNC: 43 % — SIGNIFICANT CHANGE UP (ref 43–77)
NEUTROPHILS # BLD AUTO: 0.89 K/UL — LOW (ref 1.8–7.4)
NEUTROPHILS NFR BLD AUTO: 43 % — SIGNIFICANT CHANGE UP (ref 43–77)
NEUTS BAND # BLD: 0 % — SIGNIFICANT CHANGE UP (ref 0–6)
NRBC # FLD: 0 K/UL — SIGNIFICANT CHANGE UP (ref 0–0)
OTHER - HEMATOLOGY %: 0 — SIGNIFICANT CHANGE UP
PLATELET # BLD AUTO: 172 K/UL — SIGNIFICANT CHANGE UP (ref 150–400)
PLATELET COUNT - ESTIMATE: NORMAL — SIGNIFICANT CHANGE UP
PMV BLD: 10.4 FL — SIGNIFICANT CHANGE UP (ref 7–13)
POIKILOCYTOSIS BLD QL AUTO: SLIGHT — SIGNIFICANT CHANGE UP
POLYCHROMASIA BLD QL SMEAR: SLIGHT — SIGNIFICANT CHANGE UP
POTASSIUM SERPL-MCNC: 3.6 MMOL/L — SIGNIFICANT CHANGE UP (ref 3.5–5.3)
POTASSIUM SERPL-SCNC: 3.6 MMOL/L — SIGNIFICANT CHANGE UP (ref 3.5–5.3)
PROMYELOCYTES # FLD: 0 % — SIGNIFICANT CHANGE UP (ref 0–0)
PROT SERPL-MCNC: 7.9 G/DL — SIGNIFICANT CHANGE UP (ref 6–8.3)
RBC # BLD: 4.46 M/UL — SIGNIFICANT CHANGE UP (ref 3.8–5.2)
RBC # FLD: 13.2 % — SIGNIFICANT CHANGE UP (ref 10.3–14.5)
SMUDGE CELLS # BLD: PRESENT — SIGNIFICANT CHANGE UP
SODIUM SERPL-SCNC: 140 MMOL/L — SIGNIFICANT CHANGE UP (ref 135–145)
VARIANT LYMPHS # BLD: 7 % — SIGNIFICANT CHANGE UP
WBC # BLD: 2.07 K/UL — LOW (ref 3.8–10.5)
WBC # FLD AUTO: 2.07 K/UL — LOW (ref 3.8–10.5)

## 2019-05-20 PROCEDURE — 99284 EMERGENCY DEPT VISIT MOD MDM: CPT

## 2019-05-20 RX ORDER — KETOROLAC TROMETHAMINE 30 MG/ML
30 SYRINGE (ML) INJECTION ONCE
Refills: 0 | Status: DISCONTINUED | OUTPATIENT
Start: 2019-05-20 | End: 2019-05-20

## 2019-05-20 RX ORDER — METOCLOPRAMIDE HCL 10 MG
10 TABLET ORAL ONCE
Refills: 0 | Status: COMPLETED | OUTPATIENT
Start: 2019-05-20 | End: 2019-05-20

## 2019-05-20 RX ORDER — SODIUM CHLORIDE 9 MG/ML
1000 INJECTION INTRAMUSCULAR; INTRAVENOUS; SUBCUTANEOUS ONCE
Refills: 0 | Status: COMPLETED | OUTPATIENT
Start: 2019-05-20 | End: 2019-05-20

## 2019-05-20 RX ADMIN — Medication 10 MILLIGRAM(S): at 16:04

## 2019-05-20 RX ADMIN — SODIUM CHLORIDE 2000 MILLILITER(S): 9 INJECTION INTRAMUSCULAR; INTRAVENOUS; SUBCUTANEOUS at 16:04

## 2019-05-20 RX ADMIN — Medication 30 MILLIGRAM(S): at 16:17

## 2019-05-20 NOTE — ED PROVIDER NOTE - CLINICAL SUMMARY MEDICAL DECISION MAKING FREE TEXT BOX
32 y/o female c/o bodyaches fever chills headache weakness/malaise x 4 days  -probable viral syndrome/gastroenteritis  -labs, iv fluids, reglan toradol  -reassess

## 2019-05-20 NOTE — ED PROVIDER NOTE - OBJECTIVE STATEMENT
34 y/o female no PMH presents to ED c/o multiple complaints. Pt. states friday evening (3 days ago) developed chills and fevers followed by nausea and multiple episodes of vomiting - states over past 48 hours has been experiencing worsening bocyaches fevers chills headache and generalized weakness as well as lack of appetite. Pt. Denies diarrhea, blood in vomit, recent sick consults. States has been taking advil and tylenol with minimal relief.

## 2020-11-20 ENCOUNTER — APPOINTMENT (OUTPATIENT)
Dept: OBGYN | Facility: CLINIC | Age: 35
End: 2020-11-20

## 2020-12-27 ENCOUNTER — FORM ENCOUNTER (OUTPATIENT)
Age: 35
End: 2020-12-27

## 2020-12-28 ENCOUNTER — APPOINTMENT (OUTPATIENT)
Dept: OBGYN | Facility: CLINIC | Age: 35
End: 2020-12-28
Payer: COMMERCIAL

## 2020-12-28 PROCEDURE — 99072 ADDL SUPL MATRL&STAF TM PHE: CPT

## 2020-12-28 PROCEDURE — 36415 COLL VENOUS BLD VENIPUNCTURE: CPT

## 2020-12-28 PROCEDURE — 99395 PREV VISIT EST AGE 18-39: CPT

## 2020-12-30 ENCOUNTER — RESULT REVIEW (OUTPATIENT)
Age: 35
End: 2020-12-30

## 2020-12-30 ENCOUNTER — OUTPATIENT (OUTPATIENT)
Dept: OUTPATIENT SERVICES | Facility: HOSPITAL | Age: 35
LOS: 1 days | End: 2020-12-30
Payer: MEDICAID

## 2020-12-30 ENCOUNTER — APPOINTMENT (OUTPATIENT)
Dept: ULTRASOUND IMAGING | Facility: IMAGING CENTER | Age: 35
End: 2020-12-30
Payer: MEDICAID

## 2020-12-30 DIAGNOSIS — N92.0 EXCESSIVE AND FREQUENT MENSTRUATION WITH REGULAR CYCLE: ICD-10-CM

## 2020-12-30 DIAGNOSIS — Z98.890 OTHER SPECIFIED POSTPROCEDURAL STATES: Chronic | ICD-10-CM

## 2020-12-30 DIAGNOSIS — O34.219 MATERNAL CARE FOR UNSPECIFIED TYPE SCAR FROM PREVIOUS CESAREAN DELIVERY: Chronic | ICD-10-CM

## 2020-12-30 DIAGNOSIS — R10.2 PELVIC AND PERINEAL PAIN: ICD-10-CM

## 2020-12-30 PROCEDURE — 76830 TRANSVAGINAL US NON-OB: CPT | Mod: 26

## 2020-12-30 PROCEDURE — 76856 US EXAM PELVIC COMPLETE: CPT | Mod: 26,59

## 2020-12-30 PROCEDURE — 76830 TRANSVAGINAL US NON-OB: CPT

## 2020-12-30 PROCEDURE — 76856 US EXAM PELVIC COMPLETE: CPT

## 2021-01-05 ENCOUNTER — FORM ENCOUNTER (OUTPATIENT)
Age: 36
End: 2021-01-05

## 2021-02-03 ENCOUNTER — APPOINTMENT (OUTPATIENT)
Dept: OBGYN | Facility: CLINIC | Age: 36
End: 2021-02-03
Payer: COMMERCIAL

## 2021-02-03 ENCOUNTER — RESULT REVIEW (OUTPATIENT)
Age: 36
End: 2021-02-03

## 2021-02-03 PROCEDURE — 99213 OFFICE O/P EST LOW 20 MIN: CPT

## 2021-02-03 PROCEDURE — 99072 ADDL SUPL MATRL&STAF TM PHE: CPT

## 2021-02-04 ENCOUNTER — FORM ENCOUNTER (OUTPATIENT)
Age: 36
End: 2021-02-04

## 2021-02-10 ENCOUNTER — FORM ENCOUNTER (OUTPATIENT)
Age: 36
End: 2021-02-10

## 2021-04-01 ENCOUNTER — NON-APPOINTMENT (OUTPATIENT)
Age: 36
End: 2021-04-01

## 2021-04-29 ENCOUNTER — FORM ENCOUNTER (OUTPATIENT)
Age: 36
End: 2021-04-29

## 2021-04-30 ENCOUNTER — APPOINTMENT (OUTPATIENT)
Dept: OBGYN | Facility: CLINIC | Age: 36
End: 2021-04-30
Payer: MEDICAID

## 2021-04-30 PROCEDURE — 99072 ADDL SUPL MATRL&STAF TM PHE: CPT

## 2021-04-30 PROCEDURE — 36415 COLL VENOUS BLD VENIPUNCTURE: CPT

## 2021-04-30 PROCEDURE — 99213 OFFICE O/P EST LOW 20 MIN: CPT | Mod: 25

## 2021-04-30 PROCEDURE — 76817 TRANSVAGINAL US OBSTETRIC: CPT

## 2021-05-18 ENCOUNTER — FORM ENCOUNTER (OUTPATIENT)
Age: 36
End: 2021-05-18

## 2021-05-20 ENCOUNTER — FORM ENCOUNTER (OUTPATIENT)
Age: 36
End: 2021-05-20

## 2021-05-21 ENCOUNTER — APPOINTMENT (OUTPATIENT)
Dept: OBGYN | Facility: CLINIC | Age: 36
End: 2021-05-21
Payer: MEDICAID

## 2021-05-21 PROCEDURE — 76801 OB US < 14 WKS SINGLE FETUS: CPT

## 2021-05-21 PROCEDURE — 76813 OB US NUCHAL MEAS 1 GEST: CPT | Mod: 59

## 2021-05-21 PROCEDURE — 36415 COLL VENOUS BLD VENIPUNCTURE: CPT

## 2021-05-21 PROCEDURE — 0500F INITIAL PRENATAL CARE VISIT: CPT

## 2021-05-24 ENCOUNTER — FORM ENCOUNTER (OUTPATIENT)
Age: 36
End: 2021-05-24

## 2021-05-31 ENCOUNTER — FORM ENCOUNTER (OUTPATIENT)
Age: 36
End: 2021-05-31

## 2021-06-10 ENCOUNTER — FORM ENCOUNTER (OUTPATIENT)
Age: 36
End: 2021-06-10

## 2021-06-16 ENCOUNTER — APPOINTMENT (OUTPATIENT)
Dept: OBGYN | Facility: CLINIC | Age: 36
End: 2021-06-16
Payer: MEDICAID

## 2021-06-16 PROCEDURE — 0502F SUBSEQUENT PRENATAL CARE: CPT

## 2021-06-16 PROCEDURE — 36415 COLL VENOUS BLD VENIPUNCTURE: CPT

## 2021-07-13 ENCOUNTER — APPOINTMENT (OUTPATIENT)
Dept: ANTEPARTUM | Facility: CLINIC | Age: 36
End: 2021-07-13
Payer: MEDICAID

## 2021-07-13 ENCOUNTER — ASOB RESULT (OUTPATIENT)
Age: 36
End: 2021-07-13

## 2021-07-13 PROCEDURE — 76811 OB US DETAILED SNGL FETUS: CPT

## 2021-07-21 ENCOUNTER — ASOB RESULT (OUTPATIENT)
Age: 36
End: 2021-07-21

## 2021-07-21 ENCOUNTER — APPOINTMENT (OUTPATIENT)
Dept: ANTEPARTUM | Facility: CLINIC | Age: 36
End: 2021-07-21
Payer: MEDICAID

## 2021-07-21 PROCEDURE — 76816 OB US FOLLOW-UP PER FETUS: CPT

## 2021-07-22 ENCOUNTER — APPOINTMENT (OUTPATIENT)
Dept: OBGYN | Facility: CLINIC | Age: 36
End: 2021-07-22
Payer: MEDICAID

## 2021-07-22 PROCEDURE — 0502F SUBSEQUENT PRENATAL CARE: CPT

## 2021-08-03 ENCOUNTER — OUTPATIENT (OUTPATIENT)
Dept: OUTPATIENT SERVICES | Facility: HOSPITAL | Age: 36
LOS: 1 days | End: 2021-08-03
Payer: MEDICAID

## 2021-08-03 VITALS — SYSTOLIC BLOOD PRESSURE: 117 MMHG | OXYGEN SATURATION: 96 % | DIASTOLIC BLOOD PRESSURE: 74 MMHG | HEART RATE: 87 BPM

## 2021-08-03 VITALS — HEART RATE: 86 BPM | OXYGEN SATURATION: 95 %

## 2021-08-03 DIAGNOSIS — Z98.890 OTHER SPECIFIED POSTPROCEDURAL STATES: Chronic | ICD-10-CM

## 2021-08-03 DIAGNOSIS — Z3A.00 WEEKS OF GESTATION OF PREGNANCY NOT SPECIFIED: ICD-10-CM

## 2021-08-03 DIAGNOSIS — O26.899 OTHER SPECIFIED PREGNANCY RELATED CONDITIONS, UNSPECIFIED TRIMESTER: ICD-10-CM

## 2021-08-03 DIAGNOSIS — O34.219 MATERNAL CARE FOR UNSPECIFIED TYPE SCAR FROM PREVIOUS CESAREAN DELIVERY: Chronic | ICD-10-CM

## 2021-08-03 PROCEDURE — G0463: CPT

## 2021-08-03 PROCEDURE — 59025 FETAL NON-STRESS TEST: CPT

## 2021-08-03 PROCEDURE — 99214 OFFICE O/P EST MOD 30 MIN: CPT

## 2021-08-03 NOTE — OB PROVIDER TRIAGE NOTE - PSH
Delivery with history of     H/O hernia repair  2011 - umbilical  
Spine appears normal, range of motion is not limited, no muscle or joint tenderness

## 2021-08-03 NOTE — OB PROVIDER TRIAGE NOTE - HISTORY OF PRESENT ILLNESS
OB PA Triage Note    36 yo  @ 23w11 by EDC of  presents with 2 episodes of loss clear fluid. Pt states first episode happened while she was standing- she felt something leaking- rushed to the bathroom and wiped with tissue before her underwear got soiled. the second time was a few hours later while sitting down. No futher wetness or leaking noted    +FM NO CTX/VB    PNC: uncomplicated    PMH: none  Meds: PNV  All: NKA  GYN: denies fibroids/cysts/STI/abn pap  OB:  FT  5mol2es   FT  [id number]  2016 FT primary c/s for placenta previa  PSH: umbilical hernia repair- 2019  c/s x 1    Social: denies toxic habits  Psych: denies history

## 2021-08-03 NOTE — OB PROVIDER TRIAGE NOTE - NSOBPROVIDERNOTE_OBGYN_ALL_OB_FT
A/P: 34 yo P3 @ 23w1d present with loss of fluid- pt ruled out for rupture of membranes  - discharge home with routine prenatal followup  -  labor precautions given  - return if LOF, VB or labor    d/w Dr Subhash Jackson, PAC

## 2021-08-03 NOTE — OB PROVIDER TRIAGE NOTE - NSHPPHYSICALEXAM_GEN_ALL_CORE
ICU Vital Signs Last 24 Hrs  T(C): --  T(F): --  HR: 87 (03 Aug 2021 17:01) (83 - 90)  BP: 112/65 (03 Aug 2021 17:01) (112/65 - 117/74)  BP(mean): --  ABP: --  ABP(mean): --  RR: --  SpO2: 98% (03 Aug 2021 17:01) (93% - 99%)      Gen: NAD  Heart: RRR  Lungs: CTA b/l  Abd: gravid NTND    SSE: no pooling. neg nitrazine fern  VE: long/closed/high    Sono: vertex, MVP 8.78

## 2021-08-17 ENCOUNTER — FORM ENCOUNTER (OUTPATIENT)
Age: 36
End: 2021-08-17

## 2021-08-26 ENCOUNTER — FORM ENCOUNTER (OUTPATIENT)
Age: 36
End: 2021-08-26

## 2021-08-27 ENCOUNTER — APPOINTMENT (OUTPATIENT)
Dept: OBGYN | Facility: CLINIC | Age: 36
End: 2021-08-27
Payer: MEDICAID

## 2021-08-27 PROCEDURE — 0502F SUBSEQUENT PRENATAL CARE: CPT

## 2021-08-27 PROCEDURE — 36415 COLL VENOUS BLD VENIPUNCTURE: CPT

## 2021-08-29 ENCOUNTER — FORM ENCOUNTER (OUTPATIENT)
Age: 36
End: 2021-08-29

## 2021-09-01 ENCOUNTER — APPOINTMENT (OUTPATIENT)
Dept: MATERNAL FETAL MEDICINE | Facility: CLINIC | Age: 36
End: 2021-09-01
Payer: MEDICAID

## 2021-09-01 ENCOUNTER — ASOB RESULT (OUTPATIENT)
Age: 36
End: 2021-09-01

## 2021-09-01 PROCEDURE — G0109 DIAB MANAGE TRN IND/GROUP: CPT | Mod: 95

## 2021-09-02 RX ORDER — BLOOD-GLUCOSE METER
W/DEVICE KIT MISCELLANEOUS
Qty: 1 | Refills: 0 | Status: ACTIVE | COMMUNITY
Start: 2021-09-01 | End: 1900-01-01

## 2021-09-02 RX ORDER — URINE ACETONE TEST STRIPS
STRIP MISCELLANEOUS
Qty: 1 | Refills: 0 | Status: ACTIVE | COMMUNITY
Start: 2021-09-01 | End: 1900-01-01

## 2021-09-02 RX ORDER — LANCETS 33 GAUGE
EACH MISCELLANEOUS
Qty: 1 | Refills: 2 | Status: ACTIVE | COMMUNITY
Start: 2021-09-01 | End: 1900-01-01

## 2021-09-02 RX ORDER — BLOOD-GLUCOSE METER
KIT MISCELLANEOUS 4 TIMES DAILY
Qty: 4 | Refills: 1 | Status: ACTIVE | COMMUNITY
Start: 2021-09-01 | End: 1900-01-01

## 2021-09-16 ENCOUNTER — APPOINTMENT (OUTPATIENT)
Dept: MATERNAL FETAL MEDICINE | Facility: CLINIC | Age: 36
End: 2021-09-16
Payer: MEDICAID

## 2021-09-16 ENCOUNTER — ASOB RESULT (OUTPATIENT)
Age: 36
End: 2021-09-16

## 2021-09-16 PROCEDURE — G0108 DIAB MANAGE TRN  PER INDIV: CPT | Mod: 95

## 2021-09-19 ENCOUNTER — FORM ENCOUNTER (OUTPATIENT)
Age: 36
End: 2021-09-19

## 2021-09-21 ENCOUNTER — FORM ENCOUNTER (OUTPATIENT)
Age: 36
End: 2021-09-21

## 2021-09-22 ENCOUNTER — APPOINTMENT (OUTPATIENT)
Dept: OBGYN | Facility: CLINIC | Age: 36
End: 2021-09-22
Payer: MEDICAID

## 2021-09-22 ENCOUNTER — APPOINTMENT (OUTPATIENT)
Dept: OBGYN | Facility: CLINIC | Age: 36
End: 2021-09-22

## 2021-09-22 VITALS
BODY MASS INDEX: 35.99 KG/M2 | DIASTOLIC BLOOD PRESSURE: 70 MMHG | SYSTOLIC BLOOD PRESSURE: 110 MMHG | HEIGHT: 65 IN | WEIGHT: 216 LBS

## 2021-09-22 PROCEDURE — 90471 IMMUNIZATION ADMIN: CPT

## 2021-09-22 PROCEDURE — 90715 TDAP VACCINE 7 YRS/> IM: CPT

## 2021-09-22 PROCEDURE — 0502F SUBSEQUENT PRENATAL CARE: CPT

## 2021-09-27 DIAGNOSIS — Z86.19 PERSONAL HISTORY OF OTHER INFECTIOUS AND PARASITIC DISEASES: ICD-10-CM

## 2021-09-27 DIAGNOSIS — Z87.42 PERSONAL HISTORY OF OTHER DISEASES OF THE FEMALE GENITAL TRACT: ICD-10-CM

## 2021-09-27 DIAGNOSIS — Z87.59 PERSONAL HISTORY OF OTHER COMPLICATIONS OF PREGNANCY, CHILDBIRTH AND THE PUERPERIUM: ICD-10-CM

## 2021-09-27 DIAGNOSIS — O24.410 GESTATIONAL DIABETES MELLITUS IN PREGNANCY, DIET CONTROLLED: ICD-10-CM

## 2021-09-27 RX ORDER — LACTOBACILLUS ACIDOPHILUS/PECT 30 MG-20MG
TABLET ORAL
Refills: 0 | Status: ACTIVE | COMMUNITY

## 2021-09-27 NOTE — H&P PST ADULT - URINARY CATHETER
Inpatient Admission Authorization Request   NOTIFICATION OF INPATIENT ADMISSION/INPATIENT AUTHORIZATION REQUEST   SERVICING FACILITY:   [address] [place] [address]  Tax ID: [id number]  NPI: [id number]  Place of Service: [facility]patient [facility]  [address]  Place of Service Code: 24     ATTENDING PROVIDER:  Attending Name and NPI#: Lindsay Rhodes, Jae Gunn Fadumo [[id number]]  Address: [address] [place] [address]  Phone: [phone]     UTILIZATION REVIEW CONTACT:  [facility] Utilization   Network Utilization Review Department  Phone: [phone]  Fax [phone]  Email: [email or web address]     PHYSICIAN ADVISORY SERVICES:  FOR ZDFV-SA-VIUD REVIEW - MEDICAL NECESSITY DENIAL  Phone: [phone]  Fax: [phone]  Email: [email or web address]     TYPE OF REQUEST:  Inpatient Status     ADMISSION INFORMATION:  ADMISSION DATE/TIME: 9/25/21  7:24 AM  PATIENT DIAGNOSIS CODE/DESCRIPTION:  Abdominal pain [R10 9]  Generalized abdominal pain [R10 84]  DISCHARGE DATE/TIME: No discharge date for patient encounter  DISCHARGE DISPOSITION (IF DISCHARGED): Non [facility] SNF/TCU/SNU     IMPORTANT INFORMATION:  Please contact the Debbie Lyon directly with any questions or concerns regarding this request  Department voicemails are confidential     Send requests for admission clinical reviews, concurrent reviews, approvals, and administrative denials due to lack of clinical to fax [phone]  no

## 2021-09-28 ENCOUNTER — NON-APPOINTMENT (OUTPATIENT)
Age: 36
End: 2021-09-28

## 2021-09-29 ENCOUNTER — NON-APPOINTMENT (OUTPATIENT)
Age: 36
End: 2021-09-29

## 2021-10-06 ENCOUNTER — FORM ENCOUNTER (OUTPATIENT)
Age: 36
End: 2021-10-06

## 2021-10-08 DIAGNOSIS — Z34.90 ENCOUNTER FOR SUPERVISION OF NORMAL PREGNANCY, UNSPECIFIED, UNSPECIFIED TRIMESTER: ICD-10-CM

## 2021-10-10 ENCOUNTER — FORM ENCOUNTER (OUTPATIENT)
Age: 36
End: 2021-10-10

## 2021-10-11 ENCOUNTER — APPOINTMENT (OUTPATIENT)
Dept: MATERNAL FETAL MEDICINE | Facility: CLINIC | Age: 36
End: 2021-10-11
Payer: MEDICAID

## 2021-10-11 ENCOUNTER — ASOB RESULT (OUTPATIENT)
Age: 36
End: 2021-10-11

## 2021-10-11 ENCOUNTER — APPOINTMENT (OUTPATIENT)
Dept: OBGYN | Facility: CLINIC | Age: 36
End: 2021-10-11
Payer: MEDICAID

## 2021-10-11 VITALS
WEIGHT: 219 LBS | BODY MASS INDEX: 36.49 KG/M2 | HEIGHT: 65 IN | HEART RATE: 91 BPM | SYSTOLIC BLOOD PRESSURE: 107 MMHG | DIASTOLIC BLOOD PRESSURE: 74 MMHG

## 2021-10-11 PROCEDURE — 0502F SUBSEQUENT PRENATAL CARE: CPT

## 2021-10-11 PROCEDURE — 76816 OB US FOLLOW-UP PER FETUS: CPT

## 2021-10-11 PROCEDURE — G0108 DIAB MANAGE TRN  PER INDIV: CPT | Mod: 95

## 2021-10-11 PROCEDURE — 76819 FETAL BIOPHYS PROFIL W/O NST: CPT

## 2021-10-14 ENCOUNTER — NON-APPOINTMENT (OUTPATIENT)
Age: 36
End: 2021-10-14

## 2021-10-14 DIAGNOSIS — Z87.42 PERSONAL HISTORY OF OTHER DISEASES OF THE FEMALE GENITAL TRACT: ICD-10-CM

## 2021-10-14 DIAGNOSIS — Z78.9 OTHER SPECIFIED HEALTH STATUS: ICD-10-CM

## 2021-10-14 DIAGNOSIS — Z87.898 PERSONAL HISTORY OF OTHER SPECIFIED CONDITIONS: ICD-10-CM

## 2021-10-14 DIAGNOSIS — O09.529 SUPERVISION OF ELDERLY MULTIGRAVIDA, UNSPECIFIED TRIMESTER: ICD-10-CM

## 2021-10-14 LAB — CYTOLOGY CVX/VAG DOC THIN PREP: NORMAL

## 2021-10-18 ENCOUNTER — NON-APPOINTMENT (OUTPATIENT)
Age: 36
End: 2021-10-18

## 2021-10-24 ENCOUNTER — FORM ENCOUNTER (OUTPATIENT)
Age: 36
End: 2021-10-24

## 2021-10-25 ENCOUNTER — APPOINTMENT (OUTPATIENT)
Dept: OBGYN | Facility: CLINIC | Age: 36
End: 2021-10-25
Payer: MEDICAID

## 2021-10-25 ENCOUNTER — APPOINTMENT (OUTPATIENT)
Dept: MATERNAL FETAL MEDICINE | Facility: CLINIC | Age: 36
End: 2021-10-25
Payer: MEDICAID

## 2021-10-25 ENCOUNTER — ASOB RESULT (OUTPATIENT)
Age: 36
End: 2021-10-25

## 2021-10-25 DIAGNOSIS — O24.419 GESTATIONAL DIABETES MELLITUS IN PREGNANCY, UNSPECIFIED CONTROL: ICD-10-CM

## 2021-10-25 PROCEDURE — G0108 DIAB MANAGE TRN  PER INDIV: CPT | Mod: 95

## 2021-10-25 PROCEDURE — 0502F SUBSEQUENT PRENATAL CARE: CPT

## 2021-10-25 RX ORDER — PEN NEEDLE, DIABETIC 29 G X1/2"
32G X 4 MM NEEDLE, DISPOSABLE MISCELLANEOUS
Qty: 1 | Refills: 1 | Status: ACTIVE | COMMUNITY
Start: 2021-10-25 | End: 1900-01-01

## 2021-10-25 RX ORDER — ISOPROPYL ALCOHOL 0.7 ML/ML
SWAB TOPICAL
Qty: 1 | Refills: 0 | Status: ACTIVE | COMMUNITY
Start: 2021-10-25 | End: 1900-01-01

## 2021-10-25 RX ORDER — INSULIN HUMAN 100 [IU]/ML
100 INJECTION, SUSPENSION SUBCUTANEOUS
Qty: 2 | Refills: 1 | Status: ACTIVE | COMMUNITY
Start: 2021-10-25 | End: 1900-01-01

## 2021-11-01 ENCOUNTER — APPOINTMENT (OUTPATIENT)
Dept: OBGYN | Facility: CLINIC | Age: 36
End: 2021-11-01

## 2021-11-04 ENCOUNTER — ASOB RESULT (OUTPATIENT)
Age: 36
End: 2021-11-04

## 2021-11-04 ENCOUNTER — APPOINTMENT (OUTPATIENT)
Dept: OBGYN | Facility: CLINIC | Age: 36
End: 2021-11-04
Payer: MEDICAID

## 2021-11-04 DIAGNOSIS — Z23 ENCOUNTER FOR IMMUNIZATION: ICD-10-CM

## 2021-11-04 DIAGNOSIS — Z36.85 ENCOUNTER FOR ANTENATAL SCREENING FOR STREPTOCOCCUS B: ICD-10-CM

## 2021-11-04 PROCEDURE — 90686 IIV4 VACC NO PRSV 0.5 ML IM: CPT

## 2021-11-04 PROCEDURE — 76818 FETAL BIOPHYS PROFILE W/NST: CPT

## 2021-11-04 PROCEDURE — 76816 OB US FOLLOW-UP PER FETUS: CPT

## 2021-11-04 PROCEDURE — 90471 IMMUNIZATION ADMIN: CPT

## 2021-11-08 ENCOUNTER — ASOB RESULT (OUTPATIENT)
Age: 36
End: 2021-11-08

## 2021-11-08 ENCOUNTER — APPOINTMENT (OUTPATIENT)
Dept: MATERNAL FETAL MEDICINE | Facility: CLINIC | Age: 36
End: 2021-11-08
Payer: MEDICAID

## 2021-11-08 PROCEDURE — G0108 DIAB MANAGE TRN  PER INDIV: CPT | Mod: 95

## 2021-11-09 ENCOUNTER — APPOINTMENT (OUTPATIENT)
Dept: OBGYN | Facility: CLINIC | Age: 36
End: 2021-11-09
Payer: MEDICAID

## 2021-11-09 ENCOUNTER — ASOB RESULT (OUTPATIENT)
Age: 36
End: 2021-11-09

## 2021-11-09 DIAGNOSIS — O34.211 MATERNAL CARE FOR LOW TRANSVERSE SCAR FROM PREVIOUS CESAREAN DELIVERY: ICD-10-CM

## 2021-11-09 PROCEDURE — 76818 FETAL BIOPHYS PROFILE W/NST: CPT

## 2021-11-18 ENCOUNTER — APPOINTMENT (OUTPATIENT)
Dept: OBGYN | Facility: CLINIC | Age: 36
End: 2021-11-18
Payer: MEDICAID

## 2021-11-18 ENCOUNTER — OUTPATIENT (OUTPATIENT)
Dept: OUTPATIENT SERVICES | Facility: HOSPITAL | Age: 36
LOS: 1 days | End: 2021-11-18
Payer: MEDICAID

## 2021-11-18 ENCOUNTER — ASOB RESULT (OUTPATIENT)
Age: 36
End: 2021-11-18

## 2021-11-18 VITALS
DIASTOLIC BLOOD PRESSURE: 84 MMHG | TEMPERATURE: 99 F | OXYGEN SATURATION: 96 % | HEART RATE: 88 BPM | RESPIRATION RATE: 18 BRPM | SYSTOLIC BLOOD PRESSURE: 131 MMHG

## 2021-11-18 VITALS — SYSTOLIC BLOOD PRESSURE: 116 MMHG | HEART RATE: 76 BPM | DIASTOLIC BLOOD PRESSURE: 75 MMHG

## 2021-11-18 DIAGNOSIS — O34.219 MATERNAL CARE FOR UNSPECIFIED TYPE SCAR FROM PREVIOUS CESAREAN DELIVERY: Chronic | ICD-10-CM

## 2021-11-18 DIAGNOSIS — Z3A.00 WEEKS OF GESTATION OF PREGNANCY NOT SPECIFIED: ICD-10-CM

## 2021-11-18 DIAGNOSIS — Z98.890 OTHER SPECIFIED POSTPROCEDURAL STATES: Chronic | ICD-10-CM

## 2021-11-18 DIAGNOSIS — O26.899 OTHER SPECIFIED PREGNANCY RELATED CONDITIONS, UNSPECIFIED TRIMESTER: ICD-10-CM

## 2021-11-18 LAB — GLUCOSE BLDC GLUCOMTR-MCNC: 104 MG/DL — HIGH (ref 70–99)

## 2021-11-18 PROCEDURE — 76818 FETAL BIOPHYS PROFILE W/NST: CPT

## 2021-11-18 PROCEDURE — 0502F SUBSEQUENT PRENATAL CARE: CPT

## 2021-11-18 PROCEDURE — 82962 GLUCOSE BLOOD TEST: CPT

## 2021-11-18 PROCEDURE — 59025 FETAL NON-STRESS TEST: CPT

## 2021-11-18 PROCEDURE — G0463: CPT

## 2021-11-18 NOTE — OB PROVIDER TRIAGE NOTE - HISTORY OF PRESENT ILLNESS
Admission H&P    Subjective  HPI: 36yoF  at 38w3d gestational age presents for prolonged monitoring after indeterminant fetal heart rate on monitoring in office. +FM. -LOF. -CTXs. -VB. Pt denies any other concerns.    Last ate at 7pm today     – PNC: GDMA2, Humalog 16u qh. Fasting FS ~84-86. Pt has scheduled repeat section on  for GDMA.   – OBHx:    x2 (, ). Largest baby 8#7  CS in 2016 for Placenta Previa   – GynHx: denies  – PMH: denies  – PSH: Umbilical hernia repair in 2018   – Psych: denies   – Social: denies   – Meds: Insulin Humalog  – Allergies: NKDA  – Will accept blood transfusions? Yes

## 2021-11-18 NOTE — OB RN TRIAGE NOTE - NSOBDISCHARGEVS_OBGYN_ALL_OB
Anemia due to blood loss Confirmed that patient received an additional set of vital signs prior to discharge.

## 2021-11-18 NOTE — OB PROVIDER TRIAGE NOTE - NSHPPHYSICALEXAM_GEN_ALL_CORE
Objective  – Vital Signs  ICU Vital Signs Last 24 Hrs  T(C): 37 (18 Nov 2021 19:28), Max: 37 (18 Nov 2021 19:28)  T(F): 98.6 (18 Nov 2021 19:28), Max: 98.6 (18 Nov 2021 19:28)  HR: 85 (18 Nov 2021 20:13) (76 - 91)  BP: 131/84 (18 Nov 2021 19:33) (131/84 - 131/84)  BP(mean): --  ABP: --  ABP(mean): --  RR: 18 (18 Nov 2021 19:28) (18 - 18)  SpO2: 95% (18 Nov 2021 20:13) (90% - 100%)    – PE:   CV: RRR  Pulm: breathing comfortably on RA  Abd: gravid, nontender  Extr: moving all extremities with ease  – FS: 104  – VE: 1/0/-3  – FHT: baseline 140, mod variability, +accels, -decels  – [place]: uterine irritability   – EFW: 3150g by sono  – Sono: vertex

## 2021-11-19 ENCOUNTER — OUTPATIENT (OUTPATIENT)
Dept: OUTPATIENT SERVICES | Facility: HOSPITAL | Age: 36
LOS: 1 days | End: 2021-11-19
Payer: MEDICAID

## 2021-11-19 VITALS
WEIGHT: 227.08 LBS | DIASTOLIC BLOOD PRESSURE: 79 MMHG | HEIGHT: 65 IN | HEART RATE: 65 BPM | OXYGEN SATURATION: 97 % | RESPIRATION RATE: 17 BRPM | TEMPERATURE: 97 F | SYSTOLIC BLOOD PRESSURE: 108 MMHG

## 2021-11-19 DIAGNOSIS — Z98.891 HISTORY OF UTERINE SCAR FROM PREVIOUS SURGERY: ICD-10-CM

## 2021-11-19 DIAGNOSIS — O34.219 MATERNAL CARE FOR UNSPECIFIED TYPE SCAR FROM PREVIOUS CESAREAN DELIVERY: Chronic | ICD-10-CM

## 2021-11-19 DIAGNOSIS — Z29.9 ENCOUNTER FOR PROPHYLACTIC MEASURES, UNSPECIFIED: ICD-10-CM

## 2021-11-19 DIAGNOSIS — O24.419 GESTATIONAL DIABETES MELLITUS IN PREGNANCY, UNSPECIFIED CONTROL: ICD-10-CM

## 2021-11-19 DIAGNOSIS — O34.211 MATERNAL CARE FOR LOW TRANSVERSE SCAR FROM PREVIOUS CESAREAN DELIVERY: ICD-10-CM

## 2021-11-19 DIAGNOSIS — Z98.890 OTHER SPECIFIED POSTPROCEDURAL STATES: Chronic | ICD-10-CM

## 2021-11-19 LAB
A1C WITH ESTIMATED AVERAGE GLUCOSE RESULT: 6.3 % — HIGH (ref 4–5.6)
ANION GAP SERPL CALC-SCNC: 15 MMOL/L — SIGNIFICANT CHANGE UP (ref 5–17)
BLD GP AB SCN SERPL QL: NEGATIVE — SIGNIFICANT CHANGE UP
BUN SERPL-MCNC: 7 MG/DL — SIGNIFICANT CHANGE UP (ref 7–23)
CALCIUM SERPL-MCNC: 9.1 MG/DL — SIGNIFICANT CHANGE UP (ref 8.4–10.5)
CHLORIDE SERPL-SCNC: 103 MMOL/L — SIGNIFICANT CHANGE UP (ref 96–108)
CO2 SERPL-SCNC: 18 MMOL/L — LOW (ref 22–31)
CREAT SERPL-MCNC: 0.74 MG/DL — SIGNIFICANT CHANGE UP (ref 0.5–1.3)
ESTIMATED AVERAGE GLUCOSE: 134 MG/DL — HIGH (ref 68–114)
GLUCOSE SERPL-MCNC: 81 MG/DL — SIGNIFICANT CHANGE UP (ref 70–99)
HCT VFR BLD CALC: 35.8 % — SIGNIFICANT CHANGE UP (ref 34.5–45)
HGB BLD-MCNC: 11.5 G/DL — SIGNIFICANT CHANGE UP (ref 11.5–15.5)
MCHC RBC-ENTMCNC: 28.4 PG — SIGNIFICANT CHANGE UP (ref 27–34)
MCHC RBC-ENTMCNC: 32.1 GM/DL — SIGNIFICANT CHANGE UP (ref 32–36)
MCV RBC AUTO: 88.4 FL — SIGNIFICANT CHANGE UP (ref 80–100)
NRBC # BLD: 0 /100 WBCS — SIGNIFICANT CHANGE UP (ref 0–0)
PLATELET # BLD AUTO: 173 K/UL — SIGNIFICANT CHANGE UP (ref 150–400)
POTASSIUM SERPL-MCNC: 3.7 MMOL/L — SIGNIFICANT CHANGE UP (ref 3.5–5.3)
POTASSIUM SERPL-SCNC: 3.7 MMOL/L — SIGNIFICANT CHANGE UP (ref 3.5–5.3)
RBC # BLD: 4.05 M/UL — SIGNIFICANT CHANGE UP (ref 3.8–5.2)
RBC # FLD: 14.5 % — SIGNIFICANT CHANGE UP (ref 10.3–14.5)
RH IG SCN BLD-IMP: POSITIVE — SIGNIFICANT CHANGE UP
SODIUM SERPL-SCNC: 136 MMOL/L — SIGNIFICANT CHANGE UP (ref 135–145)
WBC # BLD: 9.62 K/UL — SIGNIFICANT CHANGE UP (ref 3.8–10.5)
WBC # FLD AUTO: 9.62 K/UL — SIGNIFICANT CHANGE UP (ref 3.8–10.5)

## 2021-11-19 PROCEDURE — 83036 HEMOGLOBIN GLYCOSYLATED A1C: CPT

## 2021-11-19 PROCEDURE — 86900 BLOOD TYPING SEROLOGIC ABO: CPT

## 2021-11-19 PROCEDURE — 80048 BASIC METABOLIC PNL TOTAL CA: CPT

## 2021-11-19 PROCEDURE — 86901 BLOOD TYPING SEROLOGIC RH(D): CPT

## 2021-11-19 PROCEDURE — 85027 COMPLETE CBC AUTOMATED: CPT

## 2021-11-19 PROCEDURE — 36415 COLL VENOUS BLD VENIPUNCTURE: CPT

## 2021-11-19 PROCEDURE — G0463: CPT

## 2021-11-19 PROCEDURE — 86850 RBC ANTIBODY SCREEN: CPT

## 2021-11-19 NOTE — OB PST NOTE - PROBLEM SELECTOR PLAN 2
The Caprini score indicates this patient is at risk for a VTE event (score 3-5).  Most surgical patients in this group would benefit from pharmacologic prophylaxis.  The surgical team will determine the balance between VTE risk and bleeding risk.

## 2021-11-19 NOTE — OB PST NOTE - NSHPPHYSICALEXAM_GEN_ALL_CORE
Physical Exam:  · Constitutional       well-developed; well-groomed; well-nourished; no distress  · Eyes	PERRL; conjunctiva clear  · ENMT	No oral lesions; no gross abnormalities  · Neck	supple; no JVD  · Breasts	not examined  · Back	No deformity or limitation of movement   · Respiratory	airway patent; breath sounds equal; good air movement; respirations non-labored; clear to auscultation bilaterally  · Cardiovascular	detailed exam  · Cardiovascular Details	regular rate and rhythm   · Gastrointestinal	detailed exam  · GI Normal	soft; nontender; no distention; bowel sounds normal; no guarding  · Genitourinary	patient refused   · Rectal	patient refused   · Extremities	detailed exam   · Extremities Details	no clubbing; no cyanosis; no pedal edema  · Vascular	Equal and normal pulses (carotid, femoral, dorsalis pedis)   · Neurological	detailed exam  · Neurological Details	alert and oriented x 3  · Gait/Balance	gait steady  · Skin	detailed exam  · Skin Details	warm and dry; color normal  · Lymph Nodes	no cervical or supraclavicular adenopathy  · Musculoskeletal	detailed exam  · Musculoskeletal  normal   · Psychiatric	detailed exam  · Psychiatric Details	normal affect; normal behavior  gravid uterus denies s/s of active labor Physical Exam:  · Constitutional       well-developed; well-groomed; well-nourished; no distress  · Eyes	PERRL; conjunctiva clear  · ENMT	No oral lesions; no gross abnormalities  · Neck	supple; no JVD  · Breasts	not examined  · Back	No deformity or limitation of movement   · Respiratory	airway patent; breath sounds equal; good air movement; respirations non-labored; clear to auscultation bilaterally  · Cardiovascular	detailed exam  · Cardiovascular Details	regular rate and rhythm   · Gastrointestinal	detailed exam  · GI Normal	soft; nontender; no distention; bowel sounds normal; no guarding  · Genitourinary	patient refused   · Rectal	patient refused   · Extremities	no clubbing; no cyanosis; no pedal edema  · Vascular	Equal and normal pulses (carotid, femoral, dorsalis pedis)   · Neurological	alert and oriented x 3  · Gait/Balance	gait steady  · Skin	detailed exam  · Skin Details	warm and dry; color normal  · Lymph Nodes	no cervical or supraclavicular adenopathy  · Musculoskeletal	detailed exam  · Musculoskeletal  normal   · Psychiatric	normal affect; normal behavior  gravid uterus denies s/s of active labor

## 2021-11-19 NOTE — OB PST NOTE - HISTORY OF PRESENT ILLNESS
35 y/o pregnant female presents today for presurgical evaluation.  She is scheduled for repeat  on 21.  , EDC 21, LMP 21.  This pregnancy was complicated by gestational diabetes.      Preop Covid swab is scheduled for 21 at [facility].  37 y/o pregnant female presents today for presurgical evaluation.  She is scheduled for repeat  on 21.  , EDC 21, LMP 21.  Previous  x 2 (, ) and scheduled  16 with previous pregnancy due to placenta previa.  This pregnancy was complicated by gestational diabetes and she is followed by the OB diabetic educator.  She reports that she feels well and confirms recent fetal movement.  She saw her OB yesterday and was sent to the hospital for further fetal heart monitoring, but was discharged as she reports fetal heart beat was normal.    She denies any recent infection, fever, chills, chest pain, shortness of breath, cough and wheezing.    Preop Covid swab is scheduled for 21 at [facility].

## 2021-11-19 NOTE — OB PST NOTE - NS_OBGYNHISTORY_OBGYN_ALL_OB_FT
, EDC 21  LMP 21   x 2 (, )   16  Gestational DM, , EDC 21  LMP 21   x 2 (, )  Scheduled  16- for placenta previa  Current Pregnancy complicated by Gestational DM, denies with previous pregnancies.

## 2021-11-19 NOTE — OB PST NOTE - NSICDXPASTMEDICALHX_GEN_ALL_CORE_FT
PAST MEDICAL HISTORY:  Acid reflux     Gestational diabetes     Obesity (BMI 30-39.9)     Umbilical hernia

## 2021-11-19 NOTE — OB PST NOTE - NSICDXPASTSURGICALHX_GEN_ALL_CORE_FT
PAST SURGICAL HISTORY:  Delivery with history of  - due to placenta previa    H/O hernia repair x2 ,  - umbilical

## 2021-11-19 NOTE — OB PST NOTE - NSHPREVIEWOFSYSTEMS_GEN_ALL_CORE
+ wheezing occasional Cardiovascular: No congestive heart disease, valvular heart disease, mitral valve prolapse, arrythmia, or hypertension  Pulmonary: No asthma, episodes of dyspnea, cough, Denies hx of TB, pneumonia during pregnancy, and sleep apnea, reports occasional wheezing in the morning, clears spontaneously  Peripheral Vascular: No use of anticoagulants, history or thrombophlebitis or varicosities  Endocrine: No thyroid disorders, insulin dependent diabetes or adrenal disorder  Genitourinary: No history of hematuria, recurrent urinary tract infection or kidney stones  Gastrointestinal: No diarrhea, hepatitis, ulcerative colitis, Crohn's disease, nausea or vomiting  Neurological: No migraines or headaches, seizure disorder, dizziness, visual changes, or multiple sclerosis  Hematology: No history of transfusion reaction, easy bruising or bleeding, low platelets, anemia, or thrombophilia minor  Musculoskeletal: No joint or back pain, no muscle weakness

## 2021-11-19 NOTE — OB PST NOTE - PROBLEM SELECTOR PLAN 3
Pt. instructed to decrease NPH insulin dose by 20% the night before surgery if ok with OB DM educator.   She will contact DM educator to finalize plan.  Please check fingerstick on admission.

## 2021-11-19 NOTE — OB PST NOTE - PROBLEM SELECTOR PLAN 1
Pt. is scheduled for repeat  on 21.  Preop instructions reviewed, pt verbalized understanding.  Preop labs drawn (CBC, BMP, HGBA1C, T & S).  Preop Covid swab scheduled for 21.

## 2021-11-19 NOTE — OB PST NOTE - ASSESSMENT
VIANEYI VTE 2.0 SCORE [CLOT updated 2019]    AGE RELATED RISK FACTORS                                                       MOBILITY RELATED FACTORS  [ ] Age 41-60 years                                            (1 Point)                    [ ] Bed rest                                                        (1 Point)  [ ] Age: 61-74 years                                           (2 Points)                  [ ] Plaster cast                                                   (2 Points)  [ ] Age= 75 years                                              (3 Points)                    [ ] Bed bound for more than 72 hours                 (2 Points)    DISEASE RELATED RISK FACTORS                                               GENDER SPECIFIC FACTORS  [ ] Edema in the lower extremities                       (1 Point)              [x ] Pregnancy                                                     (1 Point)  [ ] Varicose veins                                               (1 Point)                     [ ] Post-partum < 6 weeks                                   (1 Point)             [x ] BMI > 25 Kg/m2                                            (1 Point)                     [ ] Hormonal therapy  or oral contraception          (1 Point)                 [ ] Sepsis (in the previous month)                        (1 Point)               [x ] History of pregnancy complications                 (1 point)  [ ] Pneumonia or serious lung disease                                               [ ] Unexplained or recurrent                     (1 Point)           (in the previous month)                               (1 Point)  [ ] Abnormal pulmonary function test                     (1 Point)                 SURGERY RELATED RISK FACTORS  [ ] Acute myocardial infarction                              (1 Point)               [x ]  Section                                             (1 Point)  [ ] Congestive heart failure (in the previous month)  (1 Point)      [ ] Minor surgery                                                  (1 Point)   [ ] Inflammatory bowel disease                             (1 Point)               [ ] Arthroscopic surgery                                        (2 Points)  [ ] Central venous access                                      (2 Points)                [ ] General surgery lasting more than 45 minutes (2 points)  [ ] Malignancy- Present or previous                   (2 Points)                [ ] Elective arthroplasty                                         (5 points)    [ ] Stroke (in the previous month)                          (5 Points)                                                                                                                                                           HEMATOLOGY RELATED FACTORS                                                 TRAUMA RELATED RISK FACTORS  [ ] Prior episodes of VTE                                     (3 Points)                [ ] Fracture of the hip, pelvis, or leg                       (5 Points)  [ ] Positive family history for VTE                         (3 Points)             [ ] Acute spinal cord injury (in the previous month)  (5 Points)  [ ] Prothrombin 62984 A                                     (3 Points)               [ ] Paralysis  (less than 1 month)                             (5 Points)  [ ] Factor V Leiden                                             (3 Points)                  [ ] Multiple Trauma within 1 month                        (5 Points)  [ ] Lupus anticoagulants                                     (3 Points)                                                           [ ] Anticardiolipin antibodies                               (3 Points)                                                       [ ] High homocysteine in the blood                      (3 Points)                                             [ ] Other congenital or acquired thrombophilia      (3 Points)                                                [ ] Heparin induced thrombocytopenia                  (3 Points)                                     Total Score [    4      ]

## 2021-11-19 NOTE — H&P PST ADULT - NEGATIVE FEMALE-SPECIFIC SYMPTOMS
no irregular menses/no vaginal discharge/no dysmenorrhea/no amenorrhea/no menorrhagia/no spotting/no abnormal vaginal bleeding/no pelvic pain

## 2021-11-19 NOTE — OB PST NOTE - NSICDXFAMILYHX_GEN_ALL_CORE_FT
FAMILY HISTORY:  Mother  Still living? Yes, Estimated age: Age Unknown  Family history of diabetes mellitus in mother, Age at diagnosis: Age Unknown     FAMILY HISTORY:  Father  Still living? Unknown  FH: hypertension, Age at diagnosis: Age Unknown    Mother  Still living? Yes, Estimated age: Age Unknown  Family history of diabetes mellitus in mother, Age at diagnosis: Age Unknown

## 2021-11-22 ENCOUNTER — TRANSCRIPTION ENCOUNTER (OUTPATIENT)
Age: 36
End: 2021-11-22

## 2021-11-23 ENCOUNTER — ASOB RESULT (OUTPATIENT)
Age: 36
End: 2021-11-23

## 2021-11-23 ENCOUNTER — RESULT REVIEW (OUTPATIENT)
Age: 36
End: 2021-11-23

## 2021-11-23 ENCOUNTER — APPOINTMENT (OUTPATIENT)
Dept: OBGYN | Facility: CLINIC | Age: 36
End: 2021-11-23
Payer: MEDICAID

## 2021-11-23 ENCOUNTER — INPATIENT (INPATIENT)
Facility: HOSPITAL | Age: 36
LOS: 1 days | Discharge: HOME | End: 2021-11-25
Attending: STUDENT IN AN ORGANIZED HEALTH CARE EDUCATION/TRAINING PROGRAM | Admitting: STUDENT IN AN ORGANIZED HEALTH CARE EDUCATION/TRAINING PROGRAM
Payer: MEDICAID

## 2021-11-23 VITALS — DIASTOLIC BLOOD PRESSURE: 75 MMHG | SYSTOLIC BLOOD PRESSURE: 125 MMHG | HEART RATE: 90 BPM

## 2021-11-23 DIAGNOSIS — Z98.890 OTHER SPECIFIED POSTPROCEDURAL STATES: Chronic | ICD-10-CM

## 2021-11-23 DIAGNOSIS — Z3A.00 WEEKS OF GESTATION OF PREGNANCY NOT SPECIFIED: ICD-10-CM

## 2021-11-23 DIAGNOSIS — O34.219 MATERNAL CARE FOR UNSPECIFIED TYPE SCAR FROM PREVIOUS CESAREAN DELIVERY: Chronic | ICD-10-CM

## 2021-11-23 DIAGNOSIS — Z34.80 ENCOUNTER FOR SUPERVISION OF OTHER NORMAL PREGNANCY, UNSPECIFIED TRIMESTER: ICD-10-CM

## 2021-11-23 DIAGNOSIS — O26.899 OTHER SPECIFIED PREGNANCY RELATED CONDITIONS, UNSPECIFIED TRIMESTER: ICD-10-CM

## 2021-11-23 LAB
BASOPHILS # BLD AUTO: 0.02 K/UL — SIGNIFICANT CHANGE UP (ref 0–0.2)
BASOPHILS NFR BLD AUTO: 0.2 % — SIGNIFICANT CHANGE UP (ref 0–2)
BLD GP AB SCN SERPL QL: NEGATIVE — SIGNIFICANT CHANGE UP
EOSINOPHIL # BLD AUTO: 0.08 K/UL — SIGNIFICANT CHANGE UP (ref 0–0.5)
EOSINOPHIL NFR BLD AUTO: 0.8 % — SIGNIFICANT CHANGE UP (ref 0–6)
HCT VFR BLD CALC: 35.5 % — SIGNIFICANT CHANGE UP (ref 34.5–45)
HGB BLD-MCNC: 11.6 G/DL — SIGNIFICANT CHANGE UP (ref 11.5–15.5)
IMM GRANULOCYTES NFR BLD AUTO: 0.9 % — SIGNIFICANT CHANGE UP (ref 0–1.5)
LYMPHOCYTES # BLD AUTO: 2.05 K/UL — SIGNIFICANT CHANGE UP (ref 1–3.3)
LYMPHOCYTES # BLD AUTO: 20.7 % — SIGNIFICANT CHANGE UP (ref 13–44)
MCHC RBC-ENTMCNC: 28.5 PG — SIGNIFICANT CHANGE UP (ref 27–34)
MCHC RBC-ENTMCNC: 32.7 GM/DL — SIGNIFICANT CHANGE UP (ref 32–36)
MCV RBC AUTO: 87.2 FL — SIGNIFICANT CHANGE UP (ref 80–100)
MONOCYTES # BLD AUTO: 0.72 K/UL — SIGNIFICANT CHANGE UP (ref 0–0.9)
MONOCYTES NFR BLD AUTO: 7.3 % — SIGNIFICANT CHANGE UP (ref 2–14)
NEUTROPHILS # BLD AUTO: 6.94 K/UL — SIGNIFICANT CHANGE UP (ref 1.8–7.4)
NEUTROPHILS NFR BLD AUTO: 70.1 % — SIGNIFICANT CHANGE UP (ref 43–77)
NRBC # BLD: 0 /100 WBCS — SIGNIFICANT CHANGE UP (ref 0–0)
PLATELET # BLD AUTO: 174 K/UL — SIGNIFICANT CHANGE UP (ref 150–400)
RBC # BLD: 4.07 M/UL — SIGNIFICANT CHANGE UP (ref 3.8–5.2)
RBC # FLD: 14.6 % — HIGH (ref 10.3–14.5)
RH IG SCN BLD-IMP: POSITIVE — SIGNIFICANT CHANGE UP
SARS-COV-2 RNA SPEC QL NAA+PROBE: SIGNIFICANT CHANGE UP
WBC # BLD: 9.9 K/UL — SIGNIFICANT CHANGE UP (ref 3.8–10.5)
WBC # FLD AUTO: 9.9 K/UL — SIGNIFICANT CHANGE UP (ref 3.8–10.5)

## 2021-11-23 PROCEDURE — 76819 FETAL BIOPHYS PROFIL W/O NST: CPT

## 2021-11-23 PROCEDURE — 0502F SUBSEQUENT PRENATAL CARE: CPT

## 2021-11-23 PROCEDURE — 59510 CESAREAN DELIVERY: CPT | Mod: U9

## 2021-11-23 PROCEDURE — 88307 TISSUE EXAM BY PATHOLOGIST: CPT | Mod: 26

## 2021-11-23 RX ORDER — CITRIC ACID/SODIUM CITRATE 300-500 MG
15 SOLUTION, ORAL ORAL EVERY 6 HOURS
Refills: 0 | Status: DISCONTINUED | OUTPATIENT
Start: 2021-11-23 | End: 2021-11-23

## 2021-11-23 RX ORDER — OXYTOCIN 10 UNIT/ML
333.33 VIAL (ML) INJECTION
Qty: 20 | Refills: 0 | Status: DISCONTINUED | OUTPATIENT
Start: 2021-11-23 | End: 2021-11-25

## 2021-11-23 RX ORDER — SODIUM CHLORIDE 9 MG/ML
1000 INJECTION, SOLUTION INTRAVENOUS
Refills: 0 | Status: DISCONTINUED | OUTPATIENT
Start: 2021-11-23 | End: 2021-11-25

## 2021-11-23 RX ORDER — OXYTOCIN 10 UNIT/ML
333.33 VIAL (ML) INJECTION
Qty: 20 | Refills: 0 | Status: DISCONTINUED | OUTPATIENT
Start: 2021-11-23 | End: 2021-11-23

## 2021-11-23 RX ORDER — DIPHENHYDRAMINE HCL 50 MG
25 CAPSULE ORAL EVERY 6 HOURS
Refills: 0 | Status: COMPLETED | OUTPATIENT
Start: 2021-11-23 | End: 2022-10-22

## 2021-11-23 RX ORDER — OXYCODONE HYDROCHLORIDE 5 MG/1
5 TABLET ORAL
Refills: 0 | Status: COMPLETED | OUTPATIENT
Start: 2021-11-23 | End: 2021-11-30

## 2021-11-23 RX ORDER — SODIUM CHLORIDE 9 MG/ML
1000 INJECTION, SOLUTION INTRAVENOUS
Refills: 0 | Status: DISCONTINUED | OUTPATIENT
Start: 2021-11-23 | End: 2021-11-23

## 2021-11-23 RX ORDER — OXYCODONE HYDROCHLORIDE 5 MG/1
5 TABLET ORAL ONCE
Refills: 0 | Status: DISCONTINUED | OUTPATIENT
Start: 2021-11-23 | End: 2021-11-25

## 2021-11-23 RX ORDER — SIMETHICONE 80 MG/1
80 TABLET, CHEWABLE ORAL EVERY 4 HOURS
Refills: 0 | Status: DISCONTINUED | OUTPATIENT
Start: 2021-11-23 | End: 2021-11-25

## 2021-11-23 RX ORDER — SODIUM CHLORIDE 9 MG/ML
1000 INJECTION INTRAMUSCULAR; INTRAVENOUS; SUBCUTANEOUS
Refills: 0 | Status: DISCONTINUED | OUTPATIENT
Start: 2021-11-23 | End: 2021-11-25

## 2021-11-23 RX ORDER — HEPARIN SODIUM 5000 [USP'U]/ML
5000 INJECTION INTRAVENOUS; SUBCUTANEOUS
Refills: 0 | Status: DISCONTINUED | OUTPATIENT
Start: 2021-11-23 | End: 2021-11-25

## 2021-11-23 RX ORDER — LANOLIN
1 OINTMENT (GRAM) TOPICAL EVERY 6 HOURS
Refills: 0 | Status: DISCONTINUED | OUTPATIENT
Start: 2021-11-23 | End: 2021-11-25

## 2021-11-23 RX ORDER — ACETAMINOPHEN 500 MG
975 TABLET ORAL
Refills: 0 | Status: DISCONTINUED | OUTPATIENT
Start: 2021-11-23 | End: 2021-11-25

## 2021-11-23 RX ORDER — TETANUS TOXOID, REDUCED DIPHTHERIA TOXOID AND ACELLULAR PERTUSSIS VACCINE, ADSORBED 5; 2.5; 8; 8; 2.5 [IU]/.5ML; [IU]/.5ML; UG/.5ML; UG/.5ML; UG/.5ML
0.5 SUSPENSION INTRAMUSCULAR ONCE
Refills: 0 | Status: DISCONTINUED | OUTPATIENT
Start: 2021-11-23 | End: 2021-11-25

## 2021-11-23 RX ORDER — MAGNESIUM HYDROXIDE 400 MG/1
30 TABLET, CHEWABLE ORAL
Refills: 0 | Status: DISCONTINUED | OUTPATIENT
Start: 2021-11-23 | End: 2021-11-25

## 2021-11-23 RX ORDER — KETOROLAC TROMETHAMINE 30 MG/ML
30 SYRINGE (ML) INJECTION EVERY 6 HOURS
Refills: 0 | Status: DISCONTINUED | OUTPATIENT
Start: 2021-11-23 | End: 2021-11-24

## 2021-11-23 RX ORDER — IBUPROFEN 200 MG
600 TABLET ORAL EVERY 6 HOURS
Refills: 0 | Status: COMPLETED | OUTPATIENT
Start: 2021-11-23 | End: 2022-10-22

## 2021-11-23 RX ADMIN — Medication 1000 MILLIUNIT(S)/MIN: at 23:41

## 2021-11-23 RX ADMIN — Medication 400 MILLIGRAM(S): at 23:30

## 2021-11-23 NOTE — OB RN PATIENT PROFILE - NSICDXFAMILYHX_GEN_ALL_CORE_FT
FAMILY HISTORY:  Father  Still living? Unknown  FH: hypertension, Age at diagnosis: Age Unknown    Mother  Still living? Yes, Estimated age: Age Unknown  Family history of diabetes mellitus in mother, Age at diagnosis: Age Unknown

## 2021-11-23 NOTE — OB NEONATOLOGY/PEDIATRICIAN DELIVERY SUMMARY - NSPEDSNEONOTESA_OBGYN_ALL_OB_FT
Called to delivery for rCS of 39.1 wk male born to a 35 y/o  mother.  Maternal history of C/S x1 for placenta previa, oligo, Hep B+, GDM (on insulin).  Maternal labs include Blood Type A+ , HIV - , RPR - , Hep B[ + ], GBS - /, COVID-. AROM at delivery with clear fluids. Delayed cord clamping 30 sec. Baby emerged vigorous, crying, was w/d/s/s with APGARS of 9/9 . Mom plans to initiate breastfeeding feed, consents Hep B vaccine and consents circ.  EOS 0.04
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
## 2021-11-23 NOTE — OB PROVIDER DELIVERY SUMMARY - NSPROVIDERDELIVERYNOTE_OBGYN_ALL_OB_FT
rLTCS for oligohydramnios with Cat II tracing  Viable male infant   APGARS 9/9  Hysterotomy closed in two layers using caprosyn  Grossly normal uterus, tubes, and ovaries  Abdomen closed in standard fashion  Pt and infant to recovery in stable condition  Placenta to pathology  QBL: 473cc   IVF: 2000cc  UOP: 300cc    Aide Harp PA-C rLTCS for oligohydramnios with Cat II tracing  Viable male infant   Multiple true knots noted in umbilical cord  APGARS 9/9  Hysterotomy closed in two layers using caprosyn  Grossly normal uterus, tubes, and ovaries  Abdomen closed in standard fashion  Pt and infant to recovery in stable condition  Placenta to pathology  QBL: 473cc   IVF: 2000cc  UOP: 300cc    Aide Harp PA-C rLTCS for oligohydramnios with Cat II tracing  Viable male infant   Multiple true knots noted in umbilical cord  APGARS 9/9  Hysterotomy closed in two layers using caprosyn (first running, second imbricating)  Grossly normal uterus. tubes and ovaries not visualized  Abdomen closed in standard fashion  Pt and infant to recovery in stable condition  Placenta to pathology  QBL: 473cc   IVF: 2000cc  UOP: 300cc    Aide Harp PA-C

## 2021-11-23 NOTE — OB PROVIDER H&P - ASSESSMENT
A/P: 35 y/o G 4 P 3003 @39.1 wks admitted for rLTCS with newly diagnosed oligohydramnios.   - Admit to L&D  - Routine labs, IVF, NPO  - Reglan/Pepcid/Bicitra  - Abdominal prep, PAS, urban to bedside drainage  - EFM: reactive  - GBS: neg  - EFW: 3400g  - Anesthesia consult  - Dr. Buchanan aware    Aide Harp PA-C

## 2021-11-23 NOTE — OB PROVIDER H&P - NSHPPHYSICALEXAM_GEN_ALL_CORE
Vital Signs Last 24 Hrs  T(C): 36.9 (23 Nov 2021 19:35), Max: 36.9 (23 Nov 2021 19:23)  T(F): 98.4 (23 Nov 2021 19:35), Max: 98.42 (23 Nov 2021 19:23)  HR: 85 (23 Nov 2021 20:33) (77 - 93)  BP: 125/75 (23 Nov 2021 19:35) (125/75 - 125/75)  BP(mean): --  RR: 16 (23 Nov 2021 19:35) (16 - 16)  SpO2: 93% (23 Nov 2021 20:33) (91% - 100%)  Gen: NAD  CV: NRRR  Lungs: CTA  Abd: soft, gravid, non-tender  SVE: 0/50/-3  EFM: 145bpm, moderate variability, +accels, decels  [place] I: no contractions  BSUS: vtx

## 2021-11-23 NOTE — PRE-ANESTHESIA EVALUATION ADULT - NSANTHPMHFT_GEN_ALL_CORE
35 y/o  @39.1wks ([place] ) admitted with newly diagnosed oligohydramnios today presenting in labor and now with cat 2 tracing going for repeat . Has gestational DM. Denies hx of asthma, HTN

## 2021-11-23 NOTE — OB RN INTRAOPERATIVE NOTE - NSSELHIDDEN_OBGYN_ALL_OB_FT
[NS_DeliveryAttending1_OBGYN_ALL_OB_FT:[id number]],[NS_DeliveryAssist1_OBGYN_ALL_OB_FT:[id number]] [NS_DeliveryAttending1_OBGYN_ALL_OB_FT:[id number]=],[NS_DeliveryAssist1_OBGYN_ALL_OB_FT:[id number]],[NS_DeliveryRN_OBGYN_ALL_OB_FT:[id number]=]

## 2021-11-23 NOTE — OB PROVIDER H&P - ATTENDING COMMENTS
agree with above  briefly, 35 yo G 4 P 3003 @39.1 wks admitted for rLTCS with newly diagnosed oligohydramnios. now Cat 2 fht, late decel noted after contractions.  will proceed with CD when blood available. consents signed  nursing, anesth, peds aware    also mom hepb pos. infant for hbig and hepb vax after birth    ashley patino MD

## 2021-11-23 NOTE — OB PROVIDER H&P - HISTORY OF PRESENT ILLNESS
OB PA Admission Note    35 y/o  @39.1wks ([place] ) admitted with newly diagnosed oligohydramnios today. Pt denies contractions, LOF, VB. +FM. GBS negative. EFW 3400g. Pt is scheduled for rLTCS on , was hoping to go into labor, desires TOLAC.     PNC: A2- Humalog 16u QHS  ObHx: - , FT, 7#8  - , FT, 8#8  2016- pLTCS for placenta previa, FT, 7#3  GynHx: Denies hx of fibroids, ovarian cysts, abnml PAP smears, STDs  MedHx: GMDA2. Denies hx of HTN, asthma, thyroid problems, blood clots/bleeding problems, hx of blood transfusions  Meds: PNV, Humalog 16u QHS  All: NKDA  PSHx: - umbilical hernia repair with mesh placement, - umbilical hernia repair, 2016- pLTCS for placenta previa  FHx: Denies hx of blood clots/bleeding problems  Social: Denies alcohol/tobacco/drug use in pregnancy  Psych: Denies hx of anxiety/depression  OB PA Admission Note    37 y/o  @39.1wks ([place] ) admitted with newly diagnosed oligohydramnios today. Pt denies contractions, LOF, VB. +FM. GBS negative. EFW 3400g. Pt is scheduled for rLTCS on , was hoping to go into labor, desires TOLAC.     PNC: A2- Humalog 16u QHS  ObHx: - , FT, 7#8  - , FT, 8#8  2016- pLTCS for placenta previa, FT, 7#3  GynHx: Denies hx of fibroids, ovarian cysts, abnml PAP smears, STDs  MedHx: Hepatitis B carrier. GMDA2. Denies hx of HTN, asthma, thyroid problems, blood clots/bleeding problems, hx of blood transfusions  Meds: PNV, Humalog 16u QHS  All: NKDA  PSHx: - umbilical hernia repair with mesh placement, - umbilical hernia repair, - pLTCS for placenta previa  FHx: Denies hx of blood clots/bleeding problems  Social: Denies alcohol/tobacco/drug use in pregnancy  Psych: Denies hx of anxiety/depression

## 2021-11-23 NOTE — OB PROVIDER DELIVERY SUMMARY - NSSELHIDDEN_OBGYN_ALL_OB_FT
[NS_DeliveryAttending1_OBGYN_ALL_OB_FT:[id number]],[NS_DeliveryAssist1_OBGYN_ALL_OB_FT:[id number]=]

## 2021-11-24 PROBLEM — O24.419 GESTATIONAL DIABETES MELLITUS IN PREGNANCY, UNSPECIFIED CONTROL: Chronic | Status: ACTIVE | Noted: 2021-11-19

## 2021-11-24 LAB
BASOPHILS # BLD AUTO: 0.03 K/UL — SIGNIFICANT CHANGE UP (ref 0–0.2)
BASOPHILS NFR BLD AUTO: 0.2 % — SIGNIFICANT CHANGE UP (ref 0–2)
COVID-19 SPIKE DOMAIN AB INTERP: POSITIVE
COVID-19 SPIKE DOMAIN ANTIBODY RESULT: >250 U/ML — HIGH
EOSINOPHIL # BLD AUTO: 0.01 K/UL — SIGNIFICANT CHANGE UP (ref 0–0.5)
EOSINOPHIL NFR BLD AUTO: 0.1 % — SIGNIFICANT CHANGE UP (ref 0–6)
GLUCOSE BLDC GLUCOMTR-MCNC: 86 MG/DL — SIGNIFICANT CHANGE UP (ref 70–99)
HCT VFR BLD CALC: 35 % — SIGNIFICANT CHANGE UP (ref 34.5–45)
HGB BLD-MCNC: 11.3 G/DL — LOW (ref 11.5–15.5)
IMM GRANULOCYTES NFR BLD AUTO: 0.8 % — SIGNIFICANT CHANGE UP (ref 0–1.5)
LYMPHOCYTES # BLD AUTO: 1.38 K/UL — SIGNIFICANT CHANGE UP (ref 1–3.3)
LYMPHOCYTES # BLD AUTO: 7.8 % — LOW (ref 13–44)
MCHC RBC-ENTMCNC: 28.8 PG — SIGNIFICANT CHANGE UP (ref 27–34)
MCHC RBC-ENTMCNC: 32.3 GM/DL — SIGNIFICANT CHANGE UP (ref 32–36)
MCV RBC AUTO: 89.3 FL — SIGNIFICANT CHANGE UP (ref 80–100)
MONOCYTES # BLD AUTO: 0.82 K/UL — SIGNIFICANT CHANGE UP (ref 0–0.9)
MONOCYTES NFR BLD AUTO: 4.6 % — SIGNIFICANT CHANGE UP (ref 2–14)
NEUTROPHILS # BLD AUTO: 15.3 K/UL — HIGH (ref 1.8–7.4)
NEUTROPHILS NFR BLD AUTO: 86.5 % — HIGH (ref 43–77)
NRBC # BLD: 0 /100 WBCS — SIGNIFICANT CHANGE UP (ref 0–0)
PLATELET # BLD AUTO: 164 K/UL — SIGNIFICANT CHANGE UP (ref 150–400)
RBC # BLD: 3.92 M/UL — SIGNIFICANT CHANGE UP (ref 3.8–5.2)
RBC # FLD: 14.4 % — SIGNIFICANT CHANGE UP (ref 10.3–14.5)
SARS-COV-2 IGG+IGM SERPL QL IA: >250 U/ML — HIGH
SARS-COV-2 IGG+IGM SERPL QL IA: POSITIVE
T PALLIDUM AB TITR SER: NEGATIVE — SIGNIFICANT CHANGE UP
WBC # BLD: 17.68 K/UL — HIGH (ref 3.8–10.5)
WBC # FLD AUTO: 17.68 K/UL — HIGH (ref 3.8–10.5)

## 2021-11-24 RX ORDER — OXYCODONE HYDROCHLORIDE 5 MG/1
5 TABLET ORAL
Refills: 0 | Status: DISCONTINUED | OUTPATIENT
Start: 2021-11-24 | End: 2021-11-24

## 2021-11-24 RX ORDER — NALOXONE HYDROCHLORIDE 4 MG/.1ML
0.1 SPRAY NASAL
Refills: 0 | Status: DISCONTINUED | OUTPATIENT
Start: 2021-11-24 | End: 2021-11-24

## 2021-11-24 RX ORDER — MORPHINE SULFATE 50 MG/1
0.1 CAPSULE, EXTENDED RELEASE ORAL ONCE
Refills: 0 | Status: DISCONTINUED | OUTPATIENT
Start: 2021-11-24 | End: 2021-11-24

## 2021-11-24 RX ORDER — DEXAMETHASONE 0.5 MG/5ML
4 ELIXIR ORAL EVERY 6 HOURS
Refills: 0 | Status: DISCONTINUED | OUTPATIENT
Start: 2021-11-24 | End: 2021-11-24

## 2021-11-24 RX ORDER — ACETAMINOPHEN 500 MG
1000 TABLET ORAL ONCE
Refills: 0 | Status: COMPLETED | OUTPATIENT
Start: 2021-11-24 | End: 2021-11-23

## 2021-11-24 RX ORDER — ONDANSETRON 8 MG/1
4 TABLET, FILM COATED ORAL EVERY 6 HOURS
Refills: 0 | Status: DISCONTINUED | OUTPATIENT
Start: 2021-11-24 | End: 2021-11-24

## 2021-11-24 RX ORDER — DIPHENHYDRAMINE HCL 50 MG
25 CAPSULE ORAL EVERY 6 HOURS
Refills: 0 | Status: DISCONTINUED | OUTPATIENT
Start: 2021-11-24 | End: 2021-11-25

## 2021-11-24 RX ORDER — NALBUPHINE HYDROCHLORIDE 10 MG/ML
2.5 INJECTION, SOLUTION INTRAMUSCULAR; INTRAVENOUS; SUBCUTANEOUS EVERY 6 HOURS
Refills: 0 | Status: DISCONTINUED | OUTPATIENT
Start: 2021-11-24 | End: 2021-11-24

## 2021-11-24 RX ADMIN — Medication 975 MILLIGRAM(S): at 05:41

## 2021-11-24 RX ADMIN — Medication 30 MILLIGRAM(S): at 21:57

## 2021-11-24 RX ADMIN — HEPARIN SODIUM 5000 UNIT(S): 5000 INJECTION INTRAVENOUS; SUBCUTANEOUS at 06:28

## 2021-11-24 RX ADMIN — HEPARIN SODIUM 5000 UNIT(S): 5000 INJECTION INTRAVENOUS; SUBCUTANEOUS at 18:10

## 2021-11-24 RX ADMIN — Medication 30 MILLIGRAM(S): at 01:29

## 2021-11-24 RX ADMIN — Medication 975 MILLIGRAM(S): at 18:34

## 2021-11-24 RX ADMIN — Medication 975 MILLIGRAM(S): at 12:08

## 2021-11-24 RX ADMIN — Medication 30 MILLIGRAM(S): at 14:58

## 2021-11-24 RX ADMIN — Medication 30 MILLIGRAM(S): at 09:20

## 2021-11-24 RX ADMIN — Medication 30 MILLIGRAM(S): at 15:15

## 2021-11-24 RX ADMIN — Medication 25 MILLIGRAM(S): at 12:34

## 2021-11-24 RX ADMIN — Medication 975 MILLIGRAM(S): at 12:30

## 2021-11-24 RX ADMIN — Medication 975 MILLIGRAM(S): at 18:10

## 2021-11-24 RX ADMIN — Medication 30 MILLIGRAM(S): at 09:09

## 2021-11-24 RX ADMIN — Medication 30 MILLIGRAM(S): at 21:27

## 2021-11-24 NOTE — PACU DISCHARGE NOTE - PAIN:
Controlled with current regime
General Sunscreen Counseling: I recommended a broad spectrum sunscreen with a SPF of 30 or higher.  I explained that SPF 30 sunscreens block approximately 97 percent of the sun's harmful rays.  Sunscreens should be applied at least 15 minutes prior to expected sun exposure and then every 2 hours after that as long as sun exposure continues. If swimming or exercising sunscreen should be reapplied every 45 minutes to an hour after getting wet or sweating.  One ounce, or the equivalent of a shot glass full of sunscreen, is adequate to protect the skin not covered by a bathing suit. I also recommended a lip balm with a sunscreen. Sun protective clothing can be used in lieu of sunscreen but must be worn the entire time you are exposed to the sun's rays.
Detail Level: Zone

## 2021-11-24 NOTE — OB RN DELIVERY SUMMARY - NSSELHIDDEN_OBGYN_ALL_OB_FT
[NS_DeliveryAttending1_OBGYN_ALL_OB_FT:[id number]],[NS_DeliveryAssist1_OBGYN_ALL_OB_FT:[id number]=]

## 2021-11-24 NOTE — PROGRESS NOTE ADULT - ATTENDING COMMENTS
pt seen and examined at 11am. doing well. routine postop care. ambulation encouraged. danica planning for tomorrow    kenney yanez

## 2021-11-24 NOTE — PROGRESS NOTE ADULT - ASSESSMENT
A/P: 35yo POD#1 s/p LTCS.  Patient is stable and doing well post-operatively.    - Continue regular diet.  - Increase ambulation.  - Continue motrin, tylenol, oxycodone PRN for pain control.   - F/u AM CBC    Gaye Jones, PGY1

## 2021-11-24 NOTE — OB RN DELIVERY SUMMARY - NS_SEPSISRSKCALC_OBGYN_ALL_OB_FT
EOS calculated successfully. EOS Risk Factor: 0.5/1000 live births ([facility] national incidence); GA=39w1d; Temp=98.42; ROM=0.017; GBS='Negative'; Antibiotics='No antibiotics or any antibiotics < 2 hrs prior to birth'

## 2021-11-24 NOTE — OB RN DELIVERY SUMMARY - NS_DELIVERYCRNA_OBGYN_ALL_OB_FT
Goal Outcome Evaluation:        66 yo female POD 2 lumbar laminectomy. Dressing CDI. VS stable, room air. Dilaudid PCA dc'd today, pt reporting severe pain this afternoon. Call out to provider to notify. Pt ambulating to bathroom with assistance. Plan to dc home tomorrow.   castro

## 2021-11-25 ENCOUNTER — TRANSCRIPTION ENCOUNTER (OUTPATIENT)
Age: 36
End: 2021-11-25

## 2021-11-25 VITALS
DIASTOLIC BLOOD PRESSURE: 82 MMHG | TEMPERATURE: 98 F | SYSTOLIC BLOOD PRESSURE: 128 MMHG | RESPIRATION RATE: 18 BRPM | HEART RATE: 82 BPM | OXYGEN SATURATION: 96 %

## 2021-11-25 PROCEDURE — 36415 COLL VENOUS BLD VENIPUNCTURE: CPT

## 2021-11-25 PROCEDURE — 86769 SARS-COV-2 COVID-19 ANTIBODY: CPT

## 2021-11-25 PROCEDURE — 82962 GLUCOSE BLOOD TEST: CPT

## 2021-11-25 PROCEDURE — 86850 RBC ANTIBODY SCREEN: CPT

## 2021-11-25 PROCEDURE — 86900 BLOOD TYPING SEROLOGIC ABO: CPT

## 2021-11-25 PROCEDURE — 59050 FETAL MONITOR W/REPORT: CPT

## 2021-11-25 PROCEDURE — 88307 TISSUE EXAM BY PATHOLOGIST: CPT

## 2021-11-25 PROCEDURE — 86901 BLOOD TYPING SEROLOGIC RH(D): CPT

## 2021-11-25 PROCEDURE — G0463: CPT

## 2021-11-25 PROCEDURE — 59025 FETAL NON-STRESS TEST: CPT

## 2021-11-25 PROCEDURE — 86780 TREPONEMA PALLIDUM: CPT

## 2021-11-25 PROCEDURE — 87635 SARS-COV-2 COVID-19 AMP PRB: CPT

## 2021-11-25 PROCEDURE — 85025 COMPLETE CBC W/AUTO DIFF WBC: CPT

## 2021-11-25 RX ORDER — IBUPROFEN 200 MG
1 TABLET ORAL
Qty: 0 | Refills: 0 | DISCHARGE
Start: 2021-11-25

## 2021-11-25 RX ORDER — OXYCODONE HYDROCHLORIDE 5 MG/1
5 TABLET ORAL
Refills: 0 | Status: DISCONTINUED | OUTPATIENT
Start: 2021-11-25 | End: 2021-11-25

## 2021-11-25 RX ORDER — HUMAN INSULIN 100 [IU]/ML
16 INJECTION, SUSPENSION SUBCUTANEOUS
Qty: 0 | Refills: 0 | DISCHARGE

## 2021-11-25 RX ORDER — BNT162B2 0.23 MG/2.25ML
0.3 INJECTION, SUSPENSION INTRAMUSCULAR ONCE
Refills: 0 | Status: COMPLETED | OUTPATIENT
Start: 2021-11-25 | End: 2021-11-25

## 2021-11-25 RX ORDER — ACETAMINOPHEN 500 MG
3 TABLET ORAL
Qty: 0 | Refills: 0 | DISCHARGE
Start: 2021-11-25

## 2021-11-25 RX ORDER — IBUPROFEN 200 MG
600 TABLET ORAL EVERY 6 HOURS
Refills: 0 | Status: DISCONTINUED | OUTPATIENT
Start: 2021-11-25 | End: 2021-11-25

## 2021-11-25 RX ADMIN — Medication 600 MILLIGRAM(S): at 03:14

## 2021-11-25 RX ADMIN — OXYCODONE HYDROCHLORIDE 5 MILLIGRAM(S): 5 TABLET ORAL at 13:00

## 2021-11-25 RX ADMIN — HEPARIN SODIUM 5000 UNIT(S): 5000 INJECTION INTRAVENOUS; SUBCUTANEOUS at 05:48

## 2021-11-25 RX ADMIN — BNT162B2 0.3 MILLILITER(S): 0.23 INJECTION, SUSPENSION INTRAMUSCULAR at 12:58

## 2021-11-25 RX ADMIN — Medication 975 MILLIGRAM(S): at 00:53

## 2021-11-25 RX ADMIN — Medication 975 MILLIGRAM(S): at 00:23

## 2021-11-25 RX ADMIN — Medication 600 MILLIGRAM(S): at 10:15

## 2021-11-25 RX ADMIN — Medication 975 MILLIGRAM(S): at 05:49

## 2021-11-25 RX ADMIN — Medication 600 MILLIGRAM(S): at 09:34

## 2021-11-25 RX ADMIN — OXYCODONE HYDROCHLORIDE 5 MILLIGRAM(S): 5 TABLET ORAL at 12:16

## 2021-11-25 RX ADMIN — Medication 600 MILLIGRAM(S): at 02:44

## 2021-11-25 NOTE — DISCHARGE NOTE OB - MEDICATION SUMMARY - MEDICATIONS TO STOP TAKING
I will STOP taking the medications listed below when I get home from the hospital:    HumuLIN N 100 units/mL subcutaneous suspension  -- 16 unit(s) subcutaneous once a day (at bedtime)

## 2021-11-25 NOTE — CHART NOTE - NSCHARTNOTEFT_GEN_A_CORE
Patient seen for: nutrition consult for GDM postpartum education prior to discharge    Source: patient, EMR    Patient is a 35yo female POD#1 s/p LTCS  Admission date: 11/23  Antepartum course significant for GDMA2. Pt denies history of GDM with prior pregnancies.   Pt plans on breastfeeding infant    Chart reviewed, events noted. Meds/labs reviewed.    Anthropometrics:  Height: 65 inches  Pre-pregnancy weight: 195 pounds   Weight gain: 36.9 pounds   Admit/Dosing wt: 231.9 pounds     Nutrition Diagnosis:   Food and Nutrition Related Knowledge Deficit related to knowledge of post-partum GDM nutrition recommendations as evidenced by POD#1 s/p LTCS, antepartum course significant for GDM, pt denies prior hx of GDM.  Goal: Pt able to teach back 2 points of nutrition education provided.     Nutrition Intervention:  Post-partum GDM diet education provided to patient and  at bedside:   1) Increased risk of developing T2DM with GDM and ways to help prevent development  2) 4-12 week fasting oral glucose tolerance test follow-up as outpatient  3) General healthful diet and physical activity recommendations discussed  4) Increased energy needs with breastfeeding    After-delivery GDM education handout provided. Pt with no nutrition-related questions at this time. Made aware RD remains available.     Monitoring:  No other nutrition risks were identified during this encounter.  RD remains available upon request and will follow up per protocol.    Pretty Jones MS, RD, CDN [facility] Pager #[phone]
pt feeling well  pt stable  will d/c home
Day 1 of Anesthesia Pain Management Service    SUBJECTIVE:  Pain Scale Score:          [X] Refer to charted pain scores    THERAPY: Received PF spinal morphine as above    OBJECTIVE:    Sedation:        	[X] Alert	[ ] Drowsy	[ ] Arousable      [ ] Asleep       [ ] Unresponsive    Side Effects:	[X] None	[ ] Nausea	[ ] Vomiting         [ ] Pruritus  		[ ] Weakness            [ ] Numbness	          [ ] Other:    ASSESSMENT/ PLAN  [X] Patient transitioned to prn analgesics  [X] Pain management per primary service, pain service to sign off   [X]Documentation and Verification of current medications

## 2021-11-25 NOTE — PROGRESS NOTE ADULT - SUBJECTIVE AND OBJECTIVE BOX
OB Progress Note: LTCS, POD#2    S: 37yo POD#2 s/p LTCS. Pain is well controlled. She is tolerating a regular diet and passing flatus. She is voiding spontaneously and ambulating without difficulty. Denies CP/SOB, lightheadedness/dizziness, N/V.    O:  Vitals:  Vital Signs Last 24 Hrs  T(C): 36.9 (25 Nov 2021 05:45), Max: 37.1 (24 Nov 2021 08:23)  T(F): 98.4 (25 Nov 2021 05:45), Max: 98.8 (24 Nov 2021 08:23)  HR: 82 (25 Nov 2021 05:45) (77 - 90)  BP: 128/82 (25 Nov 2021 05:45) (100/64 - 128/82)  BP(mean): --  RR: 18 (25 Nov 2021 05:45) (18 - 18)  SpO2: 96% (25 Nov 2021 05:45) (96% - 97%)    MEDICATIONS  (STANDING):  acetaminophen     Tablet .. 975 milliGRAM(s) Oral <User Schedule>  dextrose 5% + sodium chloride 0.9%. 1000 milliLiter(s) (125 mL/Hr) IV Continuous <Continuous>  diphtheria/tetanus/pertussis (acellular) Vaccine (ADAcel) 0.5 milliLiter(s) IntraMuscular once  heparin   Injectable 5000 Unit(s) SubCutaneous two times a day  ibuprofen  Tablet. 600 milliGRAM(s) Oral every 6 hours  lactated ringers. 1000 milliLiter(s) (125 mL/Hr) IV Continuous <Continuous>  oxytocin Infusion 333.333 milliUNIT(s)/Min (1000 mL/Hr) IV Continuous <Continuous>  sodium chloride 0.9%. 1000 milliLiter(s) (125 mL/Hr) IV Continuous <Continuous>      MEDICATIONS  (PRN):  diphenhydrAMINE 25 milliGRAM(s) Oral every 6 hours PRN Pruritus  lanolin Ointment 1 Application(s) Topical every 6 hours PRN Sore Nipples  magnesium hydroxide Suspension 30 milliLiter(s) Oral two times a day PRN Constipation  oxyCODONE    IR 5 milliGRAM(s) Oral every 3 hours PRN Moderate to Severe Pain (4-10)  oxyCODONE    IR 5 milliGRAM(s) Oral once PRN Moderate to Severe Pain (4-10)  simethicone 80 milliGRAM(s) Chew every 4 hours PRN Gas      Labs:  Blood type: A Positive  Rubella IgG: RPR: Negative                          11.3<L>   17.68<H> >-----------< 164    ( 11-24 @ 07:10 )             35.0                        11.6   9.90 >-----------< 174    ( 11-23 @ 20:52 )             35.5          PE:  General: NAD  Abdomen: Soft, appropriately tender, incision c/d/i.  Extremities: No erythema, no pitting edema  GYN: minimal lochia on pad  
Day 1 of Anesthesia Pain Management Service    SUBJECTIVE: Doing ok  Pain Scale Score:          [X] Refer to charted pain scores    THERAPY:    s/p     100mcg PF morphine on 11\23\2021      MEDICATIONS  (STANDING):  acetaminophen     Tablet .. 975 milliGRAM(s) Oral <User Schedule>  dextrose 5% + sodium chloride 0.9%. 1000 milliLiter(s) (125 mL/Hr) IV Continuous <Continuous>  diphtheria/tetanus/pertussis (acellular) Vaccine (ADAcel) 0.5 milliLiter(s) IntraMuscular once  heparin   Injectable 5000 Unit(s) SubCutaneous two times a day  ibuprofen  Tablet. 600 milliGRAM(s) Oral every 6 hours  ketorolac   Injectable 30 milliGRAM(s) IV Push every 6 hours  lactated ringers. 1000 milliLiter(s) (125 mL/Hr) IV Continuous <Continuous>  morphine PF Spinal 0.1 milliGRAM(s) IntraThecal. once  oxytocin Infusion 333.333 milliUNIT(s)/Min (1000 mL/Hr) IV Continuous <Continuous>  sodium chloride 0.9%. 1000 milliLiter(s) (125 mL/Hr) IV Continuous <Continuous>    MEDICATIONS  (PRN):  dexAMETHasone  Injectable 4 milliGRAM(s) IV Push every 6 hours PRN Nausea  diphenhydrAMINE 25 milliGRAM(s) Oral every 6 hours PRN Pruritus  lanolin Ointment 1 Application(s) Topical every 6 hours PRN Sore Nipples  magnesium hydroxide Suspension 30 milliLiter(s) Oral two times a day PRN Constipation  nalbuphine Injectable 2.5 milliGRAM(s) IV Push every 6 hours PRN Pruritus  naloxone Injectable 0.1 milliGRAM(s) IV Push every 3 minutes PRN For ANY of the following changes in patient status:  A. Breaths Per Minute LESS THAN 10, B. Oxygen saturation LESS THAN 90%, C. Sedation score of 6 for Stop After: 4 Times  ondansetron Injectable 4 milliGRAM(s) IV Push every 6 hours PRN Nausea  oxyCODONE    IR 5 milliGRAM(s) Oral every 3 hours PRN Mild Pain (1 - 3)  oxyCODONE    IR 5 milliGRAM(s) Oral every 3 hours PRN Moderate to Severe Pain (4-10)  oxyCODONE    IR 5 milliGRAM(s) Oral once PRN Moderate to Severe Pain (4-10)  simethicone 80 milliGRAM(s) Chew every 4 hours PRN Gas      OBJECTIVE:    Sedation:        	[X] Alert	 [ ] Drowsy	[ ] Arousable      [ ] Asleep       [ ] Unresponsive    Side Effects:	[X] None 	[ ] Nausea	[ ] Vomiting         [ ] Pruritus  		[ ] Weakness            [ ] Numbness	          [ ] Other:    Vital Signs Last 24 Hrs  T(C): 37.1 (24 Nov 2021 08:23), Max: 37.1 (24 Nov 2021 08:23)  T(F): 98.8 (24 Nov 2021 08:23), Max: 98.8 (24 Nov 2021 08:23)  HR: 77 (24 Nov 2021 08:23) (70 - 93)  BP: 106/67 (24 Nov 2021 08:23) (106/67 - 130/90)  BP(mean): 92 (24 Nov 2021 02:05) (87 - 106)  RR: 18 (24 Nov 2021 08:23) (16 - 18)  SpO2: 96% (24 Nov 2021 08:23) (91% - 100%)    ASSESSMENT/ PLAN  [X] Patient to be transitioned to prn analgesics after 24 hours  [X] Pain management per primary service, pain service to sign off   [X]Documentation and Verification of current medications
OB Progress Note:  Delivery, POD#1    S: 35yo POD#1 s/p LTCS . Her pain is well controlled. She is tolerating a regular diet and passing flatus. Denies N/V. Denies CP/SOB/lightheadedness/dizziness.   She is ambulating without difficulty.   Voiding spontaneously.     O:   Vital Signs Last 24 Hrs  T(C): 36.7 (2021 06:21), Max: 37 (2021 04:15)  T(F): 98.1 (2021 06:21), Max: 98.6 (2021 04:15)  HR: 85 (2021 04:15) (70 - 93)  BP: 130/78 (2021 04:15) (114/70 - 130/90)  BP(mean): 92 (2021 02:05) (87 - 106)  RR: 18 (2021 06:21) (16 - 18)  SpO2: 97% (2021 06:21) (91% - 100%)    Labs:  Blood type: A Positive  Rubella IgG: RPR:                           11.3<L>   17.68<H> >-----------< 164    ( 11-24 @ 07:10 )             35.0                        11.6   9.90 >-----------< 174    ( 11-23 @ 20:52 )             35.5      PE:  General: NAD  Abdomen: Mildly distended, appropriately tender, incision c/d/i.  Extremities: No erythema, no pitting edema

## 2021-11-25 NOTE — DISCHARGE NOTE OB - CARE PROVIDER_API CALL
Osiris Arcos (DO)  Obstetrics and Gynecology  [address] [place] [address], [address]  [place] [address]  Phone: [phone]  Fax: [phone]  Follow Up Time:

## 2021-11-25 NOTE — PROGRESS NOTE ADULT - ASSESSMENT
A/P: 37yo POD#2 s/p LTCS.  Patient is stable and doing well post-operatively.    - Continue regular diet.  - Increase ambulation.  - Continue motrin, tylenol, oxycodone PRN for pain control.    Cecilia Murry, PGY-1

## 2021-11-25 NOTE — DISCHARGE NOTE OB - MEDICATION SUMMARY - MEDICATIONS TO TAKE
I will START or STAY ON the medications listed below when I get home from the hospital:    ibuprofen 600 mg oral tablet  -- 1 tab(s) by mouth every 6 hours  -- Indication: For  section    acetaminophen 325 mg oral tablet  -- 3 tab(s) by mouth   -- Indication: For ceasrean section

## 2021-11-25 NOTE — DISCHARGE NOTE OB - MATERIALS PROVIDED
Vaccinations/[facility]  Screening Program/  Immunization Record/Breastfeeding Log/Bottle Feeding Log/Breastfeeding Mother’s Support Group Information/Guide to Postpartum Care/[facility] Hearing Screen Program/Back To Sleep Handout/Shaken Baby Prevention Handout/Breastfeeding Guide and Packet

## 2021-11-25 NOTE — DISCHARGE NOTE OB - PATIENT PORTAL LINK FT
You can access the FollowMyHealth Patient Portal offered by [facility] by registering at the following website: [email or web address]. By joining [employer]’s FollowMyHealth portal, you will also be able to view your health information using other applications (apps) compatible with our system.

## 2021-11-25 NOTE — DISCHARGE NOTE OB - CARE PLAN
Principal Discharge DX:	 delivery delivered  Assessment and plan of treatment:	regular diet; activity as tolerated; Appt 2 weeks- please call   1

## 2021-11-29 LAB
GP B STREP DNA SPEC QL NAA+PROBE: NORMAL
GP B STREP DNA SPEC QL NAA+PROBE: NOT DETECTED
SOURCE GBS: NORMAL

## 2021-11-30 ENCOUNTER — NON-APPOINTMENT (OUTPATIENT)
Age: 36
End: 2021-11-30

## 2021-11-30 ENCOUNTER — EMERGENCY (EMERGENCY)
Facility: HOSPITAL | Age: 36
LOS: 1 days | Discharge: HOME | End: 2021-11-30
Attending: EMERGENCY MEDICINE
Payer: MEDICAID

## 2021-11-30 VITALS
HEIGHT: 65 IN | SYSTOLIC BLOOD PRESSURE: 137 MMHG | TEMPERATURE: 98 F | HEART RATE: 70 BPM | RESPIRATION RATE: 20 BRPM | DIASTOLIC BLOOD PRESSURE: 86 MMHG | WEIGHT: 227.96 LBS | OXYGEN SATURATION: 100 %

## 2021-11-30 VITALS
OXYGEN SATURATION: 98 % | TEMPERATURE: 98 F | HEART RATE: 65 BPM | SYSTOLIC BLOOD PRESSURE: 122 MMHG | DIASTOLIC BLOOD PRESSURE: 88 MMHG | RESPIRATION RATE: 17 BRPM

## 2021-11-30 DIAGNOSIS — Z98.890 OTHER SPECIFIED POSTPROCEDURAL STATES: Chronic | ICD-10-CM

## 2021-11-30 DIAGNOSIS — O34.219 MATERNAL CARE FOR UNSPECIFIED TYPE SCAR FROM PREVIOUS CESAREAN DELIVERY: Chronic | ICD-10-CM

## 2021-11-30 LAB
ALBUMIN SERPL ELPH-MCNC: 3.2 G/DL — LOW (ref 3.3–5)
ALP SERPL-CCNC: 111 U/L — SIGNIFICANT CHANGE UP (ref 40–120)
ALT FLD-CCNC: 52 U/L — HIGH (ref 10–45)
ANION GAP SERPL CALC-SCNC: 12 MMOL/L — SIGNIFICANT CHANGE UP (ref 5–17)
APPEARANCE UR: CLEAR — SIGNIFICANT CHANGE UP
APTT BLD: 28.5 SEC — SIGNIFICANT CHANGE UP (ref 27.5–35.5)
AST SERPL-CCNC: 51 U/L — HIGH (ref 10–40)
BACTERIA # UR AUTO: ABNORMAL
BASOPHILS # BLD AUTO: 0.02 K/UL — SIGNIFICANT CHANGE UP (ref 0–0.2)
BASOPHILS NFR BLD AUTO: 0.3 % — SIGNIFICANT CHANGE UP (ref 0–2)
BILIRUB SERPL-MCNC: 0.2 MG/DL — SIGNIFICANT CHANGE UP (ref 0.2–1.2)
BILIRUB UR-MCNC: NEGATIVE — SIGNIFICANT CHANGE UP
BUN SERPL-MCNC: 11 MG/DL — SIGNIFICANT CHANGE UP (ref 7–23)
CALCIUM SERPL-MCNC: 8.8 MG/DL — SIGNIFICANT CHANGE UP (ref 8.4–10.5)
CHLORIDE SERPL-SCNC: 106 MMOL/L — SIGNIFICANT CHANGE UP (ref 96–108)
CO2 SERPL-SCNC: 21 MMOL/L — LOW (ref 22–31)
COLOR SPEC: SIGNIFICANT CHANGE UP
CREAT SERPL-MCNC: 0.77 MG/DL — SIGNIFICANT CHANGE UP (ref 0.5–1.3)
DIFF PNL FLD: ABNORMAL
EOSINOPHIL # BLD AUTO: 0.11 K/UL — SIGNIFICANT CHANGE UP (ref 0–0.5)
EOSINOPHIL NFR BLD AUTO: 1.7 % — SIGNIFICANT CHANGE UP (ref 0–6)
EPI CELLS # UR: 7 /HPF — HIGH
GLUCOSE SERPL-MCNC: 104 MG/DL — HIGH (ref 70–99)
GLUCOSE UR QL: NEGATIVE — SIGNIFICANT CHANGE UP
HAPTOGLOB SERPL-MCNC: 199 MG/DL — SIGNIFICANT CHANGE UP (ref 34–200)
HCT VFR BLD CALC: 32.1 % — LOW (ref 34.5–45)
HGB BLD-MCNC: 10.3 G/DL — LOW (ref 11.5–15.5)
HYALINE CASTS # UR AUTO: 1 /LPF — SIGNIFICANT CHANGE UP (ref 0–2)
IMM GRANULOCYTES NFR BLD AUTO: 1.2 % — SIGNIFICANT CHANGE UP (ref 0–1.5)
INR BLD: 0.97 RATIO — SIGNIFICANT CHANGE UP (ref 0.88–1.16)
KETONES UR-MCNC: NEGATIVE — SIGNIFICANT CHANGE UP
LEUKOCYTE ESTERASE UR-ACNC: ABNORMAL
LYMPHOCYTES # BLD AUTO: 1.37 K/UL — SIGNIFICANT CHANGE UP (ref 1–3.3)
LYMPHOCYTES # BLD AUTO: 20.9 % — SIGNIFICANT CHANGE UP (ref 13–44)
MAGNESIUM SERPL-MCNC: 1.8 MG/DL — SIGNIFICANT CHANGE UP (ref 1.6–2.6)
MCHC RBC-ENTMCNC: 28.9 PG — SIGNIFICANT CHANGE UP (ref 27–34)
MCHC RBC-ENTMCNC: 32.1 GM/DL — SIGNIFICANT CHANGE UP (ref 32–36)
MCV RBC AUTO: 89.9 FL — SIGNIFICANT CHANGE UP (ref 80–100)
MONOCYTES # BLD AUTO: 0.43 K/UL — SIGNIFICANT CHANGE UP (ref 0–0.9)
MONOCYTES NFR BLD AUTO: 6.6 % — SIGNIFICANT CHANGE UP (ref 2–14)
NEUTROPHILS # BLD AUTO: 4.54 K/UL — SIGNIFICANT CHANGE UP (ref 1.8–7.4)
NEUTROPHILS NFR BLD AUTO: 69.3 % — SIGNIFICANT CHANGE UP (ref 43–77)
NITRITE UR-MCNC: NEGATIVE — SIGNIFICANT CHANGE UP
NRBC # BLD: 0 /100 WBCS — SIGNIFICANT CHANGE UP (ref 0–0)
PH UR: 6.5 — SIGNIFICANT CHANGE UP (ref 5–8)
PLATELET # BLD AUTO: 246 K/UL — SIGNIFICANT CHANGE UP (ref 150–400)
POTASSIUM SERPL-MCNC: 4.1 MMOL/L — SIGNIFICANT CHANGE UP (ref 3.5–5.3)
POTASSIUM SERPL-SCNC: 4.1 MMOL/L — SIGNIFICANT CHANGE UP (ref 3.5–5.3)
PROT SERPL-MCNC: 6.6 G/DL — SIGNIFICANT CHANGE UP (ref 6–8.3)
PROT UR-MCNC: NEGATIVE — SIGNIFICANT CHANGE UP
PROTHROM AB SERPL-ACNC: 11.7 SEC — SIGNIFICANT CHANGE UP (ref 10.6–13.6)
RBC # BLD: 3.57 M/UL — LOW (ref 3.8–5.2)
RBC # FLD: 14.2 % — SIGNIFICANT CHANGE UP (ref 10.3–14.5)
RBC CASTS # UR COMP ASSIST: 14 /HPF — HIGH (ref 0–4)
SODIUM SERPL-SCNC: 139 MMOL/L — SIGNIFICANT CHANGE UP (ref 135–145)
SP GR SPEC: 1.02 — SIGNIFICANT CHANGE UP (ref 1.01–1.02)
UROBILINOGEN FLD QL: NEGATIVE — SIGNIFICANT CHANGE UP
WBC # BLD: 6.55 K/UL — SIGNIFICANT CHANGE UP (ref 3.8–10.5)
WBC # FLD AUTO: 6.55 K/UL — SIGNIFICANT CHANGE UP (ref 3.8–10.5)
WBC UR QL: 13 /HPF — HIGH (ref 0–5)

## 2021-11-30 PROCEDURE — 83735 ASSAY OF MAGNESIUM: CPT

## 2021-11-30 PROCEDURE — 85025 COMPLETE CBC W/AUTO DIFF WBC: CPT

## 2021-11-30 PROCEDURE — 83010 ASSAY OF HAPTOGLOBIN QUANT: CPT

## 2021-11-30 PROCEDURE — 36415 COLL VENOUS BLD VENIPUNCTURE: CPT

## 2021-11-30 PROCEDURE — 99284 EMERGENCY DEPT VISIT MOD MDM: CPT

## 2021-11-30 PROCEDURE — 85610 PROTHROMBIN TIME: CPT

## 2021-11-30 PROCEDURE — 81001 URINALYSIS AUTO W/SCOPE: CPT

## 2021-11-30 PROCEDURE — 84550 ASSAY OF BLOOD/URIC ACID: CPT

## 2021-11-30 PROCEDURE — 80053 COMPREHEN METABOLIC PANEL: CPT

## 2021-11-30 PROCEDURE — 83615 LACTATE (LD) (LDH) ENZYME: CPT

## 2021-11-30 PROCEDURE — 85730 THROMBOPLASTIN TIME PARTIAL: CPT

## 2021-11-30 RX ORDER — ACETAMINOPHEN 500 MG
975 TABLET ORAL ONCE
Refills: 0 | Status: COMPLETED | OUTPATIENT
Start: 2021-11-30 | End: 2021-11-30

## 2021-11-30 RX ADMIN — Medication 975 MILLIGRAM(S): at 14:49

## 2021-11-30 RX ADMIN — Medication 975 MILLIGRAM(S): at 11:23

## 2021-11-30 NOTE — ED PROVIDER NOTE - NS ED ROS FT
Constitutional: no fevers, chills  HEENT: +HA, no vision changes, rhinorrhea, sore throat  Cardiac: no chest pain, palpitations +LE edema  Respiratory: no SOB, cough or hemoptysis  GI: +abd pain near csx site no RUQ pain no n/v/d/c,  bloody or dark stools  : no dysuria, frequency, or hematuria  MSK: no joint pain, neck pain or back pain  Skin: no rashes, jaundice, pruritis  Neuro: no numbness/tingling, weakness, unsteady gait  ROS otherwise negative except as per HPI

## 2021-11-30 NOTE — ED ADULT NURSE NOTE - ALCOHOL PRE SCREEN (AUDIT - C)
Statement Selected Keystone Flap Text: The defect edges were debeveled with a #15 scalpel blade.  Given the location of the defect, shape of the defect a keystone flap was deemed most appropriate.  Using a sterile surgical marker, an appropriate keystone flap was drawn incorporating the defect, outlining the appropriate donor tissue and placing the expected incisions within the relaxed skin tension lines where possible. The area thus outlined was incised deep to adipose tissue with a #15 scalpel blade.  The skin margins were undermined to an appropriate distance in all directions around the primary defect and laterally outward around the flap utilizing iris scissors.

## 2021-11-30 NOTE — ED PROVIDER NOTE - NSFOLLOWUPINSTRUCTIONS_ED_ALL_ED_FT
You were seen in the Emergency Department for headache.     1) Advance activity as tolerated.   2) Continue all previously prescribed medications as directed.    3) Follow up with your primary care physician in 24-48 hours - take copies of your results.    4) Return to the Emergency Department for worsening or persistent symptoms, and/or ANY NEW OR CONCERNING SYMPTOMS.    Headache    A headache is pain or discomfort felt around the head or neck area. The specific cause of a headache may not be found as there are many types including tension headaches, migraine headaches, and cluster headaches. Watch your condition for any changes. Things you can do to manage your pain include taking over the counter and prescription medications as instructed by your health care provider, lying down in a dark quiet room, limiting stress, getting regular sleep, and refraining from alcohol and tobacco products.    SEEK IMMEDIATE MEDICAL CARE IF YOU HAVE ANY OF THE FOLLOWING SYMPTOMS: fever, vomiting, stiff neck, loss of vision, problems with speech, muscle weakness, loss of balance, trouble walking, passing out, or confusion.

## 2021-11-30 NOTE — CONSULT NOTE ADULT - SUBJECTIVE AND OBJECTIVE BOX
R4 GYN Consult Note    HPI: 35 y/o  POD#7 s/p rLTCS presenting with L orbital headache that did not improve with Motrin at home. Patient denies visual disturbances and states that the pain is dull at worst 7/10. She received Tylenol in the ED and it has improved. She denies fever, chills, SOB, CP, n/v, dysuria. She is breastfeeding/pumping. States that she has been having normal lochia and otherwise feels well. Denies issues with  incision.    PNC: A2- Humalog 16u QHS  ObHx: - , FT, 7#8  2010- , FT, 8#8  2016- pLTCS for placenta previa, FT, 7#3  - rLTCS for hx   GynHx: Denies hx of fibroids, ovarian cysts, abnml PAP smears, STDs  MedHx: Hepatitis B carrier. GMDA2. Denies hx of HTN, asthma, thyroid problems, blood clots/bleeding problems, hx of blood transfusions  Meds: PNV, Humalog 16u QHS  All: NKDA  PSHx: - umbilical hernia repair with mesh placement, 2018- umbilical hernia repair, 2016- pLTCS for placenta previa  FHx: Denies hx of blood clots/bleeding problems  Social: Denies alcohol/tobacco/drug use   Psych: Denies hx of anxiety/depression     Name of GYN Physician: Dr. Matias    Vital Signs Last 24 Hrs  T(C): 36.7 (2021 14:50), Max: 36.8 (2021 10:24)  T(F): 98.1 (2021 14:50), Max: 98.2 (2021 10:24)  HR: 65 (2021 14:50) (65 - 71)  BP: 122/88 (2021 14:50) (122/88 - 137/86)  BP(mean): 96 (2021 13:40) (96 - 96)  RR: 17 (2021 14:50) (15 - 20)  SpO2: 98% (2021 14:50) (97% - 100%)    Physical Exam:   General: sitting comfortably in bed, NAD   HEENT: neck supple, full ROM  CV: RR   Lungs: CTA b/l, good air flow b/l   Back: No CVA tenderness  Abd: Soft, non-tender, non-distended.  Bowel sounds present.  Steri-strips in place over Pfannenstiel incision  :  Normal appearing lochia on pad  Ext: non-tender b/l, no edema     LABS:                          10.3   6.55  )-----------( 246      ( 2021 11:14 )             32.1         139  |  106  |  11  ----------------------------<  104<H>  4.1   |  21<L>  |  0.77    Ca    8.8      2021 11:14  Mg     1.8         TPro  6.6  /  Alb  3.2<L>  /  TBili  0.2  /  DBili  x   /  AST  51<H>  /  ALT  52<H>  /  AlkPhos  111      I&O's Detail    PT/INR - ( 2021 13:59 )   PT: 11.7 sec;   INR: 0.97 ratio         PTT - ( 2021 13:59 )  PTT:28.5 sec  Urinalysis Basic - ( 2021 12:53 )    Color: Light Yellow / Appearance: Clear / S.016 / pH: x  Gluc: x / Ketone: Negative  / Bili: Negative / Urobili: Negative   Blood: x / Protein: Negative / Nitrite: Negative   Leuk Esterase: Moderate / RBC: 14 /hpf / WBC 13 /HPF   Sq Epi: x / Non Sq Epi: 7 /hpf / Bacteria: Few

## 2021-11-30 NOTE — CONSULT NOTE ADULT - ASSESSMENT
37yo  POD#7 presenting with headache at home that has now resolved with Tylenol in the ED. BP in non-severe range (130s/70-low 90s) and HELLP labs wnl, low suspicion for PP PEC.    -monitor BP at home daily  -BP check in the office Thursday or Friday w Dr. Matias//Dewayne etc  -return to ED if severe headache refractory to medication, BP >150/100, severe abdominal pain, heavy vaginal bleeding >2 pads an hour  -patient expressed understanding and is in agreement with the plan    D/w Dr. Dewayne Quevedo PGY-4

## 2021-11-30 NOTE — ED PROVIDER NOTE - NSICDXPASTSURGICALHX_GEN_ALL_CORE_FT
PAST SURGICAL HISTORY:   delivery delivered 11/3/21    Delivery with history of  2016- due to placenta previa    H/O hernia repair x2 ,  - umbilical

## 2021-11-30 NOTE — ED ADULT NURSE REASSESSMENT NOTE - NS ED NURSE REASSESS COMMENT FT1
Pt is breathing unlabored on RA. VSS. Pt reports headache is gone. OBGYN at bedside for consult. Educated pt on plan of care. Safety and comfort maintained. Awaiting OBGYN recommendations.

## 2021-11-30 NOTE — ED PROVIDER NOTE - OBJECTIVE STATEMENT
h/o csx on 11/3 presents with L sided supraorbital headache x2d minimally relieved with 600mg ibuprofen. Sent in by Dr. Malcolm OB for evaluation. No h/o csx on 11/3 presents with L sided supraorbital headache x2d minimally relieved with 600mg ibuprofen. Sent in by Dr. Malcolm OB for evaluation. No trauma, fever, vomiting, RUQ pain, numbness/tingling/weakness, vision changes, or easy bruising/bleeding. Pt states she has had headaches before that are more diffuse rather than localized supraoribital. No family hx of cluster or migraine headaches.

## 2021-11-30 NOTE — ED ADULT NURSE NOTE - CHPI ED NUR SYMPTOMS POS
[FreeTextEntry1] : Mr. Salas presents to the office for followup of his acute diverticulitis. Clinically, he appears to be stable without worsening diverticulitis as evidenced by benign abdominal exam, absence of fevers and normal WBC. Given his advanced age, have discussed with pt and daughter regarding my recommendations for continued treatment with po abx, in a non discontinuous fashion, with augmentin. Prior to completion of abx course, will have him complete CT A/P to confirm resolution of disease. Has had multiple episodes of diverticulitis in past, but bc of advanced age, hope to avoid surgical intervention. Will notify pt of CT results when available.
PAIN

## 2021-11-30 NOTE — ED PROVIDER NOTE - CLINICAL SUMMARY MEDICAL DECISION MAKING FREE TEXT BOX
recent  delivery presenting with persistent HA x2 days sent in by OB for evaluation. No focal findings, severe HTN or RUQ tenderness. Will evaluate for preeclampsia/HELLP spectrum. recent  delivery presenting with persistent HA x2 days sent in by OB for evaluation. No focal findings, severe HTN or RUQ tenderness. Will evaluate for preeclampsia/HELLP spectrum.  ATTG: : post partum less than 1 month here with headache BP mildly elevated which may be secondary to pain, will check labs, pain medication and discuss with ob plan for care.

## 2021-11-30 NOTE — ED ADULT NURSE NOTE - OBJECTIVE STATEMENT
Pt is a 36 Y A&O x3 F presenting to ED with c/o headache x2 days. Pt states she is s/p  7 days ago. Pt called her OBGYN and was told to come to ED for evaluation. Pt denies blurry vision, dizziness, chest pain, numbness/tingling to the extremities. Pt arrives to ED breathing unlabored, tearful. Pt speaking in complete sentences. Skin is warm and dry. No diaphoresis noted. IV established. Safety and comfort maintained. Call bell within reach.

## 2021-11-30 NOTE — ED PROVIDER NOTE - DISPOSITION TYPE
· Med Name & Dose:   · requip 0.5  2 tab at hs  · Last refill was 5- #180 and 1 rf   To [facility]   This is [facility] requesting   Will decline   · Last visit for that condition 5-   · Date of next appt: 11/13/2018  · Date of any Lab results pertaining to medication:   ·   Sodium (mmol/L)   Date Value   05/11/2018 134 (L)     Potassium (mmol/L)   Date Value   05/11/2018 4.6     Chloride (mmol/L)   Date Value   05/11/2018 99     Glucose (mg/dL)   Date Value   05/11/2018 191 (H)     CALCIUM (mg/dL)   Date Value   05/11/2018 8.6     Carbon Dioxide (mmol/L)   Date Value   05/11/2018 25     BUN (mg/dL)   Date Value   05/11/2018 16     Creatinine (mg/dL)   Date Value   05/11/2018 0.42 (L)                  DISCHARGE

## 2021-11-30 NOTE — ED PROVIDER NOTE - ATTENDING CONTRIBUTION TO CARE
35 y/o f with pmhx GERD, s/p CSXN on 11/3/2021 presents from home for concern of headache and elevated blood pressure. was sent by her Ob Dr. Malcolm who rec she get evaluated in ED for symptoms. No fever no chills. no nausea no vomiting. no diarrhea. no change in vision.  baby is doing well at home. Patient took IBU 600mg with  min relief. patient also having some swelling in both her ankles. no chest pain. no shortness of breath.  Gen.  no acute distress  HEENT:  pupils 3 mm reactive to light EOMI pharynx clear. no facial asymmetry  Lungs:  b/l bs  CVS: S1S2   Abd;  soft non tender no distention  Ext: no pitting edema, min edema of both lower extra (trace) no erythema  Neuro: aaaox3 no focal deficits  MSK: strength 5/5 b/l upper and lower ext.

## 2021-11-30 NOTE — ED PROVIDER NOTE - PATIENT PORTAL LINK FT
You can access the FollowMyHealth Patient Portal offered by [facility] by registering at the following website: [email or web address]. By joining [employer]’s FollowMyHealth portal, you will also be able to view your health information using other applications (apps) compatible with our system.

## 2021-11-30 NOTE — ED PROVIDER NOTE - PHYSICAL EXAMINATION
General: non-toxic, NAD, tearful due to fear of being hospitalized  HEENT: NCAT, PERRL   Cardiac: RRR, no murmurs, 2+ peripheral pulses  Resp: CTAB  Abdomen: +lower quadrant pain no RUQ tenderness soft, non-distended, bowel sounds present, no rebound or guarding. no organomegaly  Extremities: +peripheral edema B, no calf tenderness, or leg size discrepancies  Skin: no rashes  Neuro: AAOx4, 5+motor, sensation grossly intact no dysmetria to FNF CN2-12 intact  Psych: mood and affect appropriate General: non-toxic, NAD, tearful due to fear of being hospitalized  HEENT: NCAT, PERRL, no conjunctival injection  Cardiac: RRR, no murmurs, 2+ peripheral pulses  Resp: CTAB  Abdomen: +lower quadrant pain no RUQ tenderness soft, non-distended, bowel sounds present, no rebound or guarding. no organomegaly  Extremities: +peripheral edema B, no calf tenderness, or leg size discrepancies  Skin: no rashes  Neuro: AAOx4, 5+motor, sensation grossly intact no dysmetria to FNF CN2-12 intact  Psych: mood and affect appropriate

## 2021-12-01 ENCOUNTER — TRANSCRIPTION ENCOUNTER (OUTPATIENT)
Age: 36
End: 2021-12-01

## 2021-12-09 ENCOUNTER — APPOINTMENT (OUTPATIENT)
Dept: OBGYN | Facility: CLINIC | Age: 36
End: 2021-12-09
Payer: MEDICAID

## 2021-12-09 VITALS
HEART RATE: 81 BPM | BODY MASS INDEX: 36.65 KG/M2 | DIASTOLIC BLOOD PRESSURE: 83 MMHG | SYSTOLIC BLOOD PRESSURE: 121 MMHG | WEIGHT: 220 LBS | HEIGHT: 65 IN

## 2021-12-09 PROCEDURE — 96127 BRIEF EMOTIONAL/BEHAV ASSMT: CPT

## 2021-12-09 PROCEDURE — 0503F POSTPARTUM CARE VISIT: CPT

## 2021-12-09 NOTE — HISTORY OF PRESENT ILLNESS
[Delivery Date: ___] : on [unfilled] [Male] : Delivery History: baby boy [Wt. ___] : weighing [unfilled] [Breastfeeding] : currently nursing [Postpartum Follow Up] : postpartum follow up [Primary C/S] : delivered by  section [Clean/Dry/Intact] : clean, dry and intact [Back to Normal] : is back to normal in size [Mild] : mild vaginal bleeding [Normal] : the vagina was normal [Not Done] : Examination of breasts not done [Doing Well] : is doing well [No Sign of Infection] : is showing no signs of infection [Excellent Pain Control] : has excellent pain control [None] : None [Complications:___] : no complications [S/Sx PP Depression] : no signs/symptoms of postpartum depression [Erythema] : not erythematous [Cervix Sample Taken] : cervical sample not taken for a Pap smear [FreeTextEntry8] : n [de-identified] : D [de-identified] : Doing well. Incision healing. [de-identified] : local care

## 2021-12-09 NOTE — END OF VISIT
[FreeTextEntry3] : I, Arlene Slater, acted solely as a scribe for Dr. Osiris Arcos MD., on 12/09/2021.\par \par All medical record entries made by the scribe were at my, Dr. Osiris Arcos MD., direction and personally dictated by me on 12/09/2021. I have personally reviewed the chart and agree that the record accurately reflects my personal performance of the history, physical exam, assessment and plan.\par

## 2021-12-10 ENCOUNTER — NON-APPOINTMENT (OUTPATIENT)
Age: 36
End: 2021-12-10

## 2022-01-13 ENCOUNTER — APPOINTMENT (OUTPATIENT)
Dept: OBGYN | Facility: CLINIC | Age: 37
End: 2022-01-13
Payer: MEDICAID

## 2022-01-13 VITALS
HEIGHT: 65 IN | WEIGHT: 210 LBS | BODY MASS INDEX: 34.99 KG/M2 | DIASTOLIC BLOOD PRESSURE: 70 MMHG | SYSTOLIC BLOOD PRESSURE: 120 MMHG

## 2022-01-13 DIAGNOSIS — N39.0 URINARY TRACT INFECTION, SITE NOT SPECIFIED: ICD-10-CM

## 2022-01-13 DIAGNOSIS — A49.9 URINARY TRACT INFECTION, SITE NOT SPECIFIED: ICD-10-CM

## 2022-01-13 DIAGNOSIS — N89.8 OTHER SPECIFIED NONINFLAMMATORY DISORDERS OF VAGINA: ICD-10-CM

## 2022-01-13 DIAGNOSIS — T14.8XXD OTHER INJURY OF UNSPECIFIED BODY REGION, SUBSEQUENT ENCOUNTER: ICD-10-CM

## 2022-01-13 PROCEDURE — 0503F POSTPARTUM CARE VISIT: CPT

## 2022-01-18 ENCOUNTER — TRANSCRIPTION ENCOUNTER (OUTPATIENT)
Age: 37
End: 2022-01-18

## 2022-01-18 PROBLEM — N39.0 UTI (URINARY TRACT INFECTION), BACTERIAL: Status: RESOLVED | Noted: 2022-01-18 | Resolved: 2022-02-17

## 2022-01-18 LAB
BACTERIA SPEC CULT: ABNORMAL
CANDIDA VAG CYTO: NOT DETECTED
G VAGINALIS+PREV SP MTYP VAG QL MICRO: DETECTED
T VAGINALIS VAG QL WET PREP: NOT DETECTED

## 2022-01-18 RX ORDER — METRONIDAZOLE 7.5 MG/G
0.75 GEL VAGINAL
Qty: 1 | Refills: 0 | Status: ACTIVE | COMMUNITY
Start: 2022-01-18 | End: 1900-01-01

## 2022-01-18 RX ORDER — SULFAMETHOXAZOLE AND TRIMETHOPRIM 800; 160 MG/1; MG/1
800-160 TABLET ORAL TWICE DAILY
Qty: 14 | Refills: 0 | Status: ACTIVE | COMMUNITY
Start: 2022-01-18 | End: 1900-01-01

## 2022-01-18 RX ORDER — FLUCONAZOLE 150 MG/1
150 TABLET ORAL
Qty: 1 | Refills: 2 | Status: ACTIVE | COMMUNITY
Start: 2022-01-18 | End: 1900-01-01

## 2022-01-18 RX ORDER — NORETHINDRONE 0.35 MG/1
0.35 TABLET ORAL DAILY
Qty: 28 | Refills: 11 | Status: DISCONTINUED | COMMUNITY
Start: 2022-01-18 | End: 2022-01-18

## 2022-01-18 NOTE — HISTORY OF PRESENT ILLNESS
[] : delivered by vaginal delivery [Postpartum Follow Up] : postpartum follow up [Delivery Date: ___] : on [unfilled] [Male] : Delivery History: baby boy [Wt. ___] : weighing [unfilled] [Breastfeeding] : currently nursing [Incisional Drainage] : incisional drainage [Clean/Dry/Intact] : clean, dry and intact [Back to Normal] : is back to normal in size [None] : no vaginal bleeding [Normal] : the vagina was normal [Examination Of The Breasts] : breasts are normal [Doing Well] : is doing well [No Sign of Infection] : is showing no signs of infection [Excellent Pain Control] : has excellent pain control [Erythema] : not erythematous [Dehiscence] : not dehisced [FreeTextEntry1] : -wound cx\par -bacterial vaginosis panel and urine culture\par -cleared to resume intercourse and other activity without restriction\par -discussed contraception\par -rx for mini pill provided\par RTO in 2 months for PAP

## 2022-01-21 ENCOUNTER — NON-APPOINTMENT (OUTPATIENT)
Age: 37
End: 2022-01-21

## 2022-01-24 ENCOUNTER — NON-APPOINTMENT (OUTPATIENT)
Age: 37
End: 2022-01-24

## 2022-02-14 NOTE — ASU PATIENT PROFILE, ADULT - PATIENT'S HEIGHT AND WEIGHT RECORDED IN THE VITAL SIGNS FLOWSHEET
1226- Pt arrives to PACU from OR on 6L of oxygen via mask. Report received. 3 lap stab sites CDI, CECIL in place to R abd. Pt tearful, states pain 7/10, medicated per MAR.     1257- Pt resting comfortably,  Jony called, no answer at this time.     1438- Report called to Bonita ORTIZ. Pt taken to room by RN, pt comfortable upon leaving PACU.   yes

## 2022-03-14 ENCOUNTER — APPOINTMENT (OUTPATIENT)
Dept: OBGYN | Facility: CLINIC | Age: 37
End: 2022-03-14
Payer: MEDICAID

## 2022-03-14 ENCOUNTER — LABORATORY RESULT (OUTPATIENT)
Age: 37
End: 2022-03-14

## 2022-03-14 VITALS
BODY MASS INDEX: 35.16 KG/M2 | WEIGHT: 211 LBS | HEIGHT: 65 IN | SYSTOLIC BLOOD PRESSURE: 120 MMHG | DIASTOLIC BLOOD PRESSURE: 85 MMHG

## 2022-03-14 DIAGNOSIS — Z11.3 ENCOUNTER FOR SCREENING FOR INFECTIONS WITH A PREDOMINANTLY SEXUAL MODE OF TRANSMISSION: ICD-10-CM

## 2022-03-14 DIAGNOSIS — Z01.419 ENCOUNTER FOR GYNECOLOGICAL EXAMINATION (GENERAL) (ROUTINE) W/OUT ABNORMAL FINDINGS: ICD-10-CM

## 2022-03-14 DIAGNOSIS — Z11.51 ENCOUNTER FOR SCREENING FOR HUMAN PAPILLOMAVIRUS (HPV): ICD-10-CM

## 2022-03-14 DIAGNOSIS — Z30.09 ENCOUNTER FOR OTHER GENERAL COUNSELING AND ADVICE ON CONTRACEPTION: ICD-10-CM

## 2022-03-14 PROCEDURE — 99395 PREV VISIT EST AGE 18-39: CPT

## 2022-03-15 PROBLEM — Z30.09 ADVISED ABOUT ORAL CONTRACEPTION: Status: ACTIVE | Noted: 2022-03-15

## 2022-03-24 LAB — CYTOLOGY CVX/VAG DOC THIN PREP: NORMAL

## 2022-03-24 NOTE — PLAN
[FreeTextEntry1] : 37 y/o  LMP 22, annual exam\par \par HCM\par -pap done today with GC& chlamydia \par -rx for Loestrin\par -vit D/calcium/exercise\par -f/u PCP for annual and appropriate immunizations\par -rto 1 year

## 2022-03-24 NOTE — END OF VISIT
[FreeTextEntry3] : I, Tiffanie Jackson, acted as a scribe on behalf for Dr. Priscilla Malcolm on 03/14/2022.\par \par All medical entries made by the scribe were at my, Dr. Priscilla Malcolm, direction and personally dictated by me on 03/14/2022. I have reviewed the chart and agree that the record accurately reflects my personal performance of the history, physical exam, assessment, and plan. I have personally directed, reviewed, and agreed with the chart.

## 2022-03-31 NOTE — ASU DISCHARGE PLAN (ADULT/PEDIATRIC). - ACCOMPANYING ADULT'S SIGNATURE_______________________________________
Statement Selected uses a cane due to spinal stenosis with sciatica, h/o lumbar spine surgery, PVD, limited physical activities

## 2022-04-14 ENCOUNTER — TRANSCRIPTION ENCOUNTER (OUTPATIENT)
Age: 37
End: 2022-04-14

## 2022-04-15 LAB — HPV HIGH+LOW RISK DNA PNL CVX: DETECTED

## 2022-05-03 ENCOUNTER — NON-APPOINTMENT (OUTPATIENT)
Age: 37
End: 2022-05-03

## 2022-07-10 ENCOUNTER — EMERGENCY (EMERGENCY)
Facility: HOSPITAL | Age: 37
LOS: 1 days | Discharge: HOME | End: 2022-07-10
Attending: EMERGENCY MEDICINE | Admitting: EMERGENCY MEDICINE

## 2022-07-10 VITALS
HEIGHT: 65 IN | OXYGEN SATURATION: 100 % | RESPIRATION RATE: 20 BRPM | HEART RATE: 96 BPM | DIASTOLIC BLOOD PRESSURE: 85 MMHG | SYSTOLIC BLOOD PRESSURE: 136 MMHG | TEMPERATURE: 97 F

## 2022-07-10 DIAGNOSIS — O34.219 MATERNAL CARE FOR UNSPECIFIED TYPE SCAR FROM PREVIOUS CESAREAN DELIVERY: Chronic | ICD-10-CM

## 2022-07-10 DIAGNOSIS — Z98.890 OTHER SPECIFIED POSTPROCEDURAL STATES: Chronic | ICD-10-CM

## 2022-07-10 PROCEDURE — 10060 I&D ABSCESS SIMPLE/SINGLE: CPT

## 2022-07-10 PROCEDURE — 99284 EMERGENCY DEPT VISIT MOD MDM: CPT | Mod: 25

## 2022-07-10 RX ORDER — MIDAZOLAM HYDROCHLORIDE 1 MG/ML
5 INJECTION, SOLUTION INTRAMUSCULAR; INTRAVENOUS ONCE
Refills: 0 | Status: DISCONTINUED | OUTPATIENT
Start: 2022-07-10 | End: 2022-07-10

## 2022-07-10 RX ORDER — LIDOCAINE/EPINEPHR/TETRACAINE 4-0.09-0.5
1 GEL WITH PREFILLED APPLICATOR (ML) TOPICAL ONCE
Refills: 0 | Status: COMPLETED | OUTPATIENT
Start: 2022-07-10 | End: 2022-07-10

## 2022-07-10 RX ORDER — DIAZEPAM 5 MG
5 TABLET ORAL ONCE
Refills: 0 | Status: DISCONTINUED | OUTPATIENT
Start: 2022-07-10 | End: 2022-07-10

## 2022-07-10 RX ADMIN — MIDAZOLAM HYDROCHLORIDE 5 MILLIGRAM(S): 1 INJECTION, SOLUTION INTRAMUSCULAR; INTRAVENOUS at 23:09

## 2022-07-10 RX ADMIN — Medication 450 MILLIGRAM(S): at 23:50

## 2022-07-10 RX ADMIN — Medication 1 APPLICATION(S): at 22:13

## 2022-07-10 NOTE — ED PROCEDURE NOTE - ATTENDING CONTRIBUTION TO CARE
Goncandida: I have seen and examined the patient face to face.  I was present during the above procedure and  have reviewed and addended the procedure note.

## 2022-07-10 NOTE — ED PROVIDER NOTE - NS ED ROS FT
REVIEW OF SYSTEMS: [] Unable to obtain because:    CONSTITUTIONAL: No fever, weight loss, or fatigue  EYES: No eye pain, visual disturbances, or discharge  ENMT:  No difficulty hearing, tinnitus, vertigo; No sinus or throat pain  NECK: No pain or stiffness  RESPIRATORY: No cough, wheezing, chills or hemoptysis; No shortness of breath  CARDIOVASCULAR: No chest pain, palpitations, dizziness, or leg swelling  GASTROINTESTINAL: No abdominal or epigastric pain. No nausea, vomiting, or hematemesis; No diarrhea or constipation. No melena or hematochezia.  GENITOURINARY: No dysuria, frequency, hematuria, or incontinence  NEUROLOGICAL: No headaches, memory loss, loss of strength, numbness, or tremors  SKIN: + abscess No itching, burning, rashes, or lesions

## 2022-07-10 NOTE — ED ADULT NURSE NOTE - OBJECTIVE STATEMENT
patient aaox4. ambulatory. hx of pilonidal abscess. complained of rectal pain. patient has pilonidal cyst about 1 inch large. no drainage noted. patient actively in distress and pain. provider at bedside for i&d. Will continue to monitor

## 2022-07-10 NOTE — ED PROVIDER NOTE - OBJECTIVE STATEMENT
37 yo F with no PMH, a/w abscess near sacrum.  Pt reports having episode 10 to 12 years ago requiring I and D.  Pt reports attempting warm compresses and sitz bathes with no relief. Reports no active drainage.  No fever/chills, No photophobia/eye pain/changes in vision, No ear pain/sore throat/dysphagia, No chest pain/palpitations, no SOB/cough/wheeze/stridor, No abd pain, No N/V/D, no dysuria/frequency/discharge, No neck/back pain, no changes in neurological status/function.

## 2022-07-10 NOTE — ED PROVIDER NOTE - PHYSICAL EXAMINATION
GEN - NAD; well appearing; A+O x3; non-toxic appearing  CARD -s1s2, RRR, no M,G,R;   PULM - CTA b/l, symmetric breath sounds;   ABD -  +BS, ND, NT, soft, no guarding, no rebound, no masses;   Left gluteal cleft with small 1cm fluctuance with slight surrounding erythema  BACK - no CVA tenderness, Normal  spine;   EXT - symmetric pulses, 2+ dp, capillary refill < 2 seconds, no cyanosis, no edema;   NEURO - no focal neuro deficits, no slurred speech

## 2022-07-10 NOTE — ED PROVIDER NOTE - PROGRESS NOTE DETAILS
Robby Sadler, PGY1 pt very anxious about her abscess, crying shaking before procedure was attempted. will give versed for anxiolysis.

## 2022-07-10 NOTE — ED PROVIDER NOTE - PATIENT PORTAL LINK FT
You can access the FollowMyHealth Patient Portal offered by [facility] by registering at the following website: [email or web address]. By joining [employer]’s FollowMyHealth portal, you will also be able to view your health information using other applications (apps) compatible with our system.

## 2022-09-13 NOTE — ED ADULT TRIAGE NOTE - INTERNATIONAL TRAVEL
Spoke with client briefly who informed she is physically not feeling well today. Will me for next regularly scheduled appointment.     BIANKA Almanza, LICSW  9/13/2022       No

## 2022-09-22 NOTE — OB RN INTRAOPERATIVE NOTE - NS_ROOMIN_OBGYN_ALL_OB_DT
September 22, 2022     Patient: Mercy Argueta   YOB: 2012   Date of Visit: 9/22/2022       To Whom it May Concern:    Mercy Argueta was seen in my clinic on 9/22/2022 at 10:30 am.     Please excuse Mercy for her absence from school on the date listed above to be able to make her appointment.    Sincerely,         [facility], [facility] CLINICAL    Medical information is confidential and cannot be disclosed without the written consent of the patient or her representative.       23-Nov-2021 21:55

## 2022-11-14 ENCOUNTER — NON-APPOINTMENT (OUTPATIENT)
Age: 37
End: 2022-11-14

## 2023-03-30 RX ORDER — NORETHINDRONE 0.35 MG/1
0.35 TABLET ORAL DAILY
Qty: 3 | Refills: 3 | Status: DISCONTINUED | COMMUNITY
Start: 2022-01-13 | End: 2023-03-30

## 2023-03-30 RX ORDER — NORETHINDRONE 0.35 MG/1
0.35 TABLET ORAL DAILY
Qty: 1 | Refills: 3 | Status: DISCONTINUED | COMMUNITY
Start: 2017-01-18 | End: 2023-03-30

## 2023-03-31 ENCOUNTER — NON-APPOINTMENT (OUTPATIENT)
Age: 38
End: 2023-03-31

## 2023-04-13 NOTE — OB PROVIDER TRIAGE NOTE - LIVING CHILDREN, OB PROFILE
Problem: Falls - Risk of  Goal: *Absence of Falls  Description: Document Nick Scott Fall Risk and appropriate interventions in the flowsheet. 4/13/2023 0125 by Clarisa Giang RN  Note: Fall Risk Interventions:     Medication Interventions: Teach patient to arise slowly         Bunny Climes presented alert and oriented, flat affect, mood labile. She was noted resting quietly in bed. She denied SI/HI/AVH. She was complaint with her scheduled meds. She appeared to have slept approx 8 hours during the night. No distress observed. She declined to have her blood drawn for morning labs. Monitoring for safety and behaviors continues. 3

## 2023-06-14 ENCOUNTER — APPOINTMENT (OUTPATIENT)
Dept: OBGYN | Facility: CLINIC | Age: 38
End: 2023-06-14
Payer: MEDICAID

## 2023-06-14 VITALS
SYSTOLIC BLOOD PRESSURE: 125 MMHG | HEIGHT: 65 IN | WEIGHT: 189 LBS | BODY MASS INDEX: 31.49 KG/M2 | DIASTOLIC BLOOD PRESSURE: 80 MMHG

## 2023-06-14 DIAGNOSIS — N92.6 IRREGULAR MENSTRUATION, UNSPECIFIED: ICD-10-CM

## 2023-06-14 PROCEDURE — 99395 PREV VISIT EST AGE 18-39: CPT

## 2023-06-20 RX ORDER — FLUCONAZOLE 150 MG/1
150 TABLET ORAL
Qty: 2 | Refills: 1 | Status: ACTIVE | COMMUNITY
Start: 2023-06-20 | End: 1900-01-01

## 2023-06-20 RX ORDER — METRONIDAZOLE 7.5 MG/G
0.75 GEL VAGINAL
Qty: 1 | Refills: 0 | Status: ACTIVE | COMMUNITY
Start: 2023-06-20 | End: 1900-01-01

## 2023-06-20 NOTE — PLAN
[FreeTextEntry1] : 37 year old female presents for an annual\par \par HCM\par -PAP done today with GC/CT\par -BD affirm \par -Rx for ocp \par -Discussed IUD - pt to have US guidance if she chooses to\par \par -Rx for pelvic sono - baseline\par \par RTO in 1 year normal...

## 2023-06-20 NOTE — HISTORY OF PRESENT ILLNESS
[FreeTextEntry1] : 37 year old female presents for an annual. C/o change in her menses since starting Aurovela Fe 1-20, period is dark - can be heavy or light. Usually last 7 days. Most recent bleeding began Sunday 6/11/23 during active pills and lasted for 2 days. Otherwise, pt is doing well with ocp. Currently still sexually active.

## 2023-06-29 ENCOUNTER — OUTPATIENT (OUTPATIENT)
Dept: OUTPATIENT SERVICES | Facility: HOSPITAL | Age: 38
LOS: 1 days | End: 2023-06-29
Payer: MEDICAID

## 2023-06-29 ENCOUNTER — APPOINTMENT (OUTPATIENT)
Dept: ULTRASOUND IMAGING | Facility: CLINIC | Age: 38
End: 2023-06-29
Payer: MEDICAID

## 2023-06-29 DIAGNOSIS — N92.6 IRREGULAR MENSTRUATION, UNSPECIFIED: ICD-10-CM

## 2023-06-29 DIAGNOSIS — O34.219 MATERNAL CARE FOR UNSPECIFIED TYPE SCAR FROM PREVIOUS CESAREAN DELIVERY: Chronic | ICD-10-CM

## 2023-06-29 DIAGNOSIS — Z98.890 OTHER SPECIFIED POSTPROCEDURAL STATES: Chronic | ICD-10-CM

## 2023-06-29 PROCEDURE — 76856 US EXAM PELVIC COMPLETE: CPT | Mod: 26

## 2023-06-29 PROCEDURE — 76830 TRANSVAGINAL US NON-OB: CPT

## 2023-06-29 PROCEDURE — 76830 TRANSVAGINAL US NON-OB: CPT | Mod: 26

## 2023-06-29 PROCEDURE — 76856 US EXAM PELVIC COMPLETE: CPT
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
## 2023-12-31 PROBLEM — Z11.3 ENCOUNTER FOR SCREENING EXAMINATION FOR SEXUALLY TRANSMITTED DISEASE: Status: ACTIVE | Noted: 2022-03-14

## 2023-12-31 PROBLEM — Z86.19 HISTORY OF SEXUALLY TRANSMITTED DISEASE: Status: RESOLVED | Noted: 2021-09-27 | Resolved: 2023-12-31

## 2024-04-10 NOTE — OB RN TRIAGE NOTE - NS_TRIAGETIMEOF NOTIFICATION_OBGYN_ALL_OB_DT
The history is provided by the patient.   Illness   The current episode started more than 2 weeks ago. The onset was gradual. The problem is mild. Associated symptoms include diarrhea, nausea, congestion and muscle aches. Pertinent negatives include no fever, no vomiting, no ear pain, no headaches, no sore throat, no cough, no wheezing, no rash, no eye discharge, no eye pain and no eye redness.        Review of Systems   Constitutional:  Negative for chills and fever.   HENT:  Positive for congestion. Negative for ear pain, sinus pressure and sore throat.    Eyes:  Negative for pain, discharge and redness.   Respiratory:  Negative for cough, shortness of breath and wheezing.    Cardiovascular:  Negative for chest pain.   Gastrointestinal:  Positive for diarrhea and nausea. Negative for abdominal distention and vomiting.   Genitourinary:  Negative for dysuria and frequency.   Musculoskeletal:  Negative for arthralgias and back pain.   Skin:  Negative for rash and wound.   Neurological:  Negative for weakness and headaches.   Hematological:  Negative for adenopathy.   All other systems reviewed and are negative.       Physical Exam  Vitals and nursing note reviewed.   Constitutional:       Appearance: She is well-developed.   HENT:      Head: Normocephalic and atraumatic.      Right Ear: Tympanic membrane normal.      Left Ear: Tympanic membrane normal.      Nose: Congestion present.   Eyes:      Pupils: Pupils are equal, round, and reactive to light.   Cardiovascular:      Rate and Rhythm: Normal rate and regular rhythm.      Heart sounds: Normal heart sounds. No murmur heard.  Pulmonary:      Effort: Pulmonary effort is normal. No respiratory distress.      Breath sounds: Normal breath sounds. No wheezing or rales.   Abdominal:      General: Bowel sounds are normal.      Palpations: Abdomen is soft.      Tenderness: There is no abdominal tenderness. There is no guarding or rebound.   Musculoskeletal:      
18-Nov-2021 19:34

## 2024-05-21 DIAGNOSIS — D25.9 LEIOMYOMA OF UTERUS, UNSPECIFIED: ICD-10-CM

## 2024-05-21 LAB
CANDIDA VAG CYTO: NOT DETECTED
CYTOLOGY CVX/VAG DOC THIN PREP: NORMAL
G VAGINALIS+PREV SP MTYP VAG QL MICRO: DETECTED
HPV HIGH+LOW RISK DNA PNL CVX: NOT DETECTED
T VAGINALIS VAG QL WET PREP: NOT DETECTED

## 2024-06-04 ENCOUNTER — RX RENEWAL (OUTPATIENT)
Age: 39
End: 2024-06-04

## 2024-06-04 RX ORDER — NORETHINDRONE ACETATE AND ETHINYL ESTRADIOL AND FERROUS FUMARATE 1MG-20(24)
1-20 KIT ORAL
Qty: 3 | Refills: 0 | Status: ACTIVE | COMMUNITY
Start: 2022-03-15 | End: 1900-01-01

## 2024-06-11 ENCOUNTER — OUTPATIENT (OUTPATIENT)
Dept: OUTPATIENT SERVICES | Facility: HOSPITAL | Age: 39
LOS: 1 days | End: 2024-06-11
Payer: MEDICAID

## 2024-06-11 ENCOUNTER — APPOINTMENT (OUTPATIENT)
Dept: ULTRASOUND IMAGING | Facility: CLINIC | Age: 39
End: 2024-06-11
Payer: MEDICAID

## 2024-06-11 DIAGNOSIS — D25.9 LEIOMYOMA OF UTERUS, UNSPECIFIED: ICD-10-CM

## 2024-06-11 DIAGNOSIS — Z98.890 OTHER SPECIFIED POSTPROCEDURAL STATES: Chronic | ICD-10-CM

## 2024-06-11 DIAGNOSIS — O34.219 MATERNAL CARE FOR UNSPECIFIED TYPE SCAR FROM PREVIOUS CESAREAN DELIVERY: Chronic | ICD-10-CM

## 2024-06-11 PROCEDURE — 76856 US EXAM PELVIC COMPLETE: CPT | Mod: 26

## 2024-06-11 PROCEDURE — 76856 US EXAM PELVIC COMPLETE: CPT

## 2024-06-11 PROCEDURE — 76830 TRANSVAGINAL US NON-OB: CPT

## 2024-06-11 PROCEDURE — 76830 TRANSVAGINAL US NON-OB: CPT | Mod: 26

## 2024-06-25 ENCOUNTER — TRANSCRIPTION ENCOUNTER (OUTPATIENT)
Age: 39
End: 2024-06-25

## 2024-06-25 DIAGNOSIS — N84.0 POLYP OF CORPUS UTERI: ICD-10-CM

## 2024-07-18 ENCOUNTER — APPOINTMENT (OUTPATIENT)
Dept: OBGYN | Facility: CLINIC | Age: 39
End: 2024-07-18
Payer: MEDICAID

## 2024-07-18 VITALS
HEIGHT: 65 IN | SYSTOLIC BLOOD PRESSURE: 141 MMHG | BODY MASS INDEX: 31.32 KG/M2 | WEIGHT: 188 LBS | DIASTOLIC BLOOD PRESSURE: 93 MMHG

## 2024-07-18 PROCEDURE — 99395 PREV VISIT EST AGE 18-39: CPT

## 2024-07-19 LAB
C TRACH RRNA SPEC QL NAA+PROBE: NOT DETECTED
HPV HIGH+LOW RISK DNA PNL CVX: NOT DETECTED
N GONORRHOEA RRNA SPEC QL NAA+PROBE: NOT DETECTED
SOURCE TP AMPLIFICATION: NORMAL

## 2024-07-22 LAB — CYTOLOGY CVX/VAG DOC THIN PREP: NORMAL

## 2024-07-23 NOTE — ED PROVIDER NOTE - CPE EDP MUSC NORM
"Contacted patient for a status update since starting prednisone. Patient stated, "I am much better. I am not coughing any more." Notified patient of her upcoming appointment on 8/28/24. Instructed her to contact us for any questions or concerns. Informed her that I would notify Dr. uDncan of her response to the prednisone. She verbalized her understanding of all discussed.    Notified Dr. Duncan of above during the ALD / Pre-Lung Transplant Patient Review Meeting today.  " - - -

## 2024-07-29 ENCOUNTER — TRANSCRIPTION ENCOUNTER (OUTPATIENT)
Age: 39
End: 2024-07-29

## 2024-07-30 ENCOUNTER — NON-APPOINTMENT (OUTPATIENT)
Age: 39
End: 2024-07-30

## 2024-08-27 ENCOUNTER — OUTPATIENT (OUTPATIENT)
Dept: OUTPATIENT SERVICES | Facility: HOSPITAL | Age: 39
LOS: 1 days | End: 2024-08-27
Payer: MEDICAID

## 2024-08-27 VITALS
HEART RATE: 85 BPM | TEMPERATURE: 98 F | WEIGHT: 185.63 LBS | HEIGHT: 65 IN | DIASTOLIC BLOOD PRESSURE: 89 MMHG | RESPIRATION RATE: 18 BRPM | SYSTOLIC BLOOD PRESSURE: 129 MMHG | OXYGEN SATURATION: 100 %

## 2024-08-27 DIAGNOSIS — N84.0 POLYP OF CORPUS UTERI: ICD-10-CM

## 2024-08-27 DIAGNOSIS — Z01.818 ENCOUNTER FOR OTHER PREPROCEDURAL EXAMINATION: ICD-10-CM

## 2024-08-27 DIAGNOSIS — Z98.890 OTHER SPECIFIED POSTPROCEDURAL STATES: Chronic | ICD-10-CM

## 2024-08-27 DIAGNOSIS — O34.219 MATERNAL CARE FOR UNSPECIFIED TYPE SCAR FROM PREVIOUS CESAREAN DELIVERY: Chronic | ICD-10-CM

## 2024-08-27 LAB
ANION GAP SERPL CALC-SCNC: 12 MMOL/L — SIGNIFICANT CHANGE UP (ref 5–17)
BUN SERPL-MCNC: 8 MG/DL — SIGNIFICANT CHANGE UP (ref 7–23)
CALCIUM SERPL-MCNC: 9.8 MG/DL — SIGNIFICANT CHANGE UP (ref 8.4–10.5)
CHLORIDE SERPL-SCNC: 106 MMOL/L — SIGNIFICANT CHANGE UP (ref 96–108)
CO2 SERPL-SCNC: 22 MMOL/L — SIGNIFICANT CHANGE UP (ref 22–31)
CREAT SERPL-MCNC: 0.99 MG/DL — SIGNIFICANT CHANGE UP (ref 0.5–1.3)
EGFR: 75 ML/MIN/1.73M2 — SIGNIFICANT CHANGE UP
GLUCOSE SERPL-MCNC: 91 MG/DL — SIGNIFICANT CHANGE UP (ref 70–99)
HCT VFR BLD CALC: 37.5 % — SIGNIFICANT CHANGE UP (ref 34.5–45)
HCV AB S/CO SERPL IA: 0.06 S/CO — SIGNIFICANT CHANGE UP
HCV AB SERPL-IMP: SIGNIFICANT CHANGE UP
HGB BLD-MCNC: 11.9 G/DL — SIGNIFICANT CHANGE UP (ref 11.5–15.5)
MCHC RBC-ENTMCNC: 29 PG — SIGNIFICANT CHANGE UP (ref 27–34)
MCHC RBC-ENTMCNC: 31.7 GM/DL — LOW (ref 32–36)
MCV RBC AUTO: 91.2 FL — SIGNIFICANT CHANGE UP (ref 80–100)
NRBC # BLD: 0 /100 WBCS — SIGNIFICANT CHANGE UP (ref 0–0)
PLATELET # BLD AUTO: 236 K/UL — SIGNIFICANT CHANGE UP (ref 150–400)
POTASSIUM SERPL-MCNC: 3.7 MMOL/L — SIGNIFICANT CHANGE UP (ref 3.5–5.3)
POTASSIUM SERPL-SCNC: 3.7 MMOL/L — SIGNIFICANT CHANGE UP (ref 3.5–5.3)
RBC # BLD: 4.11 M/UL — SIGNIFICANT CHANGE UP (ref 3.8–5.2)
RBC # FLD: 13.2 % — SIGNIFICANT CHANGE UP (ref 10.3–14.5)
SODIUM SERPL-SCNC: 140 MMOL/L — SIGNIFICANT CHANGE UP (ref 135–145)
WBC # BLD: 5.75 K/UL — SIGNIFICANT CHANGE UP (ref 3.8–10.5)
WBC # FLD AUTO: 5.75 K/UL — SIGNIFICANT CHANGE UP (ref 3.8–10.5)

## 2024-08-27 PROCEDURE — G0463: CPT

## 2024-08-27 PROCEDURE — 85027 COMPLETE CBC AUTOMATED: CPT

## 2024-08-27 PROCEDURE — 80048 BASIC METABOLIC PNL TOTAL CA: CPT

## 2024-08-27 PROCEDURE — 86803 HEPATITIS C AB TEST: CPT

## 2024-08-27 PROCEDURE — 36415 COLL VENOUS BLD VENIPUNCTURE: CPT

## 2024-08-27 RX ORDER — SODIUM CHLORIDE 9 MG/ML
3 INJECTION INTRAMUSCULAR; INTRAVENOUS; SUBCUTANEOUS EVERY 8 HOURS
Refills: 0 | Status: DISCONTINUED | OUTPATIENT
Start: 2024-09-17 | End: 2024-10-01

## 2024-08-27 NOTE — H&P PST ADULT - ASSESSMENT
DASI score:  DASI activity:  Loose teeth or denture: denies DASI score: 7  DASI activity: weights/cardio 30 minutes 2x/week ; takes care of all house chores - denies limit to activity  Loose teeth or denture: denies

## 2024-08-27 NOTE — H&P PST ADULT - NSICDXPASTMEDICALHX_GEN_ALL_CORE_FT
PAST MEDICAL HISTORY:  Acid reflux     Gestational diabetes     History of hepatitis B     Obesity (BMI 30-39.9)     Umbilical hernia      PAST MEDICAL HISTORY:  Acid reflux     Gestational diabetes     History of hepatitis B     Incisional hernia     Obesity (BMI 30-39.9)     Umbilical hernia

## 2024-08-27 NOTE — H&P PST ADULT - NSICDXPASTSURGICALHX_GEN_ALL_CORE_FT
PAST SURGICAL HISTORY:  Delivery with history of  2016- due to placenta previa    H/O endoscopy     H/O hernia repair x2 ,  - umbilical    History of incisional hernia repair

## 2024-08-27 NOTE — H&P PST ADULT - PROBLEM SELECTOR PLAN 1
D&C, non-obstetrical, diagnostic hysteroscopy, polypectomy with Dr. Priscilla Malcolm on 9/17/24.   Pre-op instructions given. Questions answered.

## 2024-08-27 NOTE — H&P PST ADULT - HISTORY OF PRESENT ILLNESS
33year old female with pmhx of acid reflux, incisional hernia and constipation presents today with c/o abdominal pain x weeks. Patient is here today for presurgical testing for scheduled repair of incisional hernia and removal of excess skin on 11/8/18 38 year old female presents to PST prior to scheduled D&C, non-obstetrical, diagnostic hysteroscopy, polypectomy with Dr. Priscilla Malcolm on 24. . PMhx of obesity (BMI 30.9), acid reflux, incisional hernia repair (x2, last in 2018). Endorses heavy periods with intense cramping, constipation. Denies chest pain, palpitations, sob/cummings, dizziness, syncope.

## 2024-09-10 NOTE — OB RN TRIAGE NOTE - NSOBDISCHARGEVS_OBGYN_ALL_OB
Pt states she had some more bleeding again this afternoon.  Had a small gush of blood when she sat on toilet.  States it may have been enough to fill a panty liner.  No new or worsening cramping.  Bleeding seems to have stopped again.  Reassurance given.  Recommended monitoring, refrain from intercourse, continue taking Progesterone as prescribed and let us know if anything changes.  ED precautions discussed.   
Confirmed that patient received an additional set of vital signs prior to discharge.

## 2024-09-17 ENCOUNTER — RESULT REVIEW (OUTPATIENT)
Age: 39
End: 2024-09-17

## 2024-09-17 ENCOUNTER — APPOINTMENT (OUTPATIENT)
Dept: OBGYN | Facility: HOSPITAL | Age: 39
End: 2024-09-17

## 2024-09-17 ENCOUNTER — OUTPATIENT (OUTPATIENT)
Dept: OUTPATIENT SERVICES | Facility: HOSPITAL | Age: 39
LOS: 1 days | End: 2024-09-17
Payer: MEDICAID

## 2024-09-17 ENCOUNTER — TRANSCRIPTION ENCOUNTER (OUTPATIENT)
Age: 39
End: 2024-09-17

## 2024-09-17 VITALS
HEIGHT: 65 IN | OXYGEN SATURATION: 99 % | SYSTOLIC BLOOD PRESSURE: 120 MMHG | DIASTOLIC BLOOD PRESSURE: 80 MMHG | RESPIRATION RATE: 16 BRPM | HEART RATE: 81 BPM | TEMPERATURE: 97 F | WEIGHT: 185.63 LBS

## 2024-09-17 VITALS
SYSTOLIC BLOOD PRESSURE: 126 MMHG | HEART RATE: 70 BPM | RESPIRATION RATE: 16 BRPM | DIASTOLIC BLOOD PRESSURE: 87 MMHG | OXYGEN SATURATION: 100 %

## 2024-09-17 DIAGNOSIS — Z98.890 OTHER SPECIFIED POSTPROCEDURAL STATES: Chronic | ICD-10-CM

## 2024-09-17 DIAGNOSIS — N84.0 POLYP OF CORPUS UTERI: ICD-10-CM

## 2024-09-17 DIAGNOSIS — O34.219 MATERNAL CARE FOR UNSPECIFIED TYPE SCAR FROM PREVIOUS CESAREAN DELIVERY: Chronic | ICD-10-CM

## 2024-09-17 PROCEDURE — 58558 HYSTEROSCOPY BIOPSY: CPT

## 2024-09-17 PROCEDURE — 88305 TISSUE EXAM BY PATHOLOGIST: CPT

## 2024-09-17 PROCEDURE — 88305 TISSUE EXAM BY PATHOLOGIST: CPT | Mod: 26

## 2024-09-17 RX ORDER — ACETAMINOPHEN 325 MG/1
3 TABLET ORAL
Qty: 0 | Refills: 0 | DISCHARGE

## 2024-09-17 RX ORDER — ONDANSETRON 2 MG/ML
4 INJECTION, SOLUTION INTRAMUSCULAR; INTRAVENOUS ONCE
Refills: 0 | Status: DISCONTINUED | OUTPATIENT
Start: 2024-09-17 | End: 2024-10-01

## 2024-09-17 RX ORDER — LIDOCAINE HCL 20 MG/ML
0.2 VIAL (ML) INJECTION ONCE
Refills: 0 | Status: COMPLETED | OUTPATIENT
Start: 2024-09-17 | End: 2024-09-17

## 2024-09-17 RX ORDER — HYDROMORPHONE HYDROCHLORIDE 2 MG/1
0.5 TABLET ORAL
Refills: 0 | Status: DISCONTINUED | OUTPATIENT
Start: 2024-09-17 | End: 2024-09-17

## 2024-09-17 RX ORDER — FENTANYL CITRATE 50 UG/ML
25 INJECTION INTRAMUSCULAR; INTRAVENOUS
Refills: 0 | Status: DISCONTINUED | OUTPATIENT
Start: 2024-09-17 | End: 2024-09-17

## 2024-09-17 RX ORDER — IBUPROFEN 600 MG
1 TABLET ORAL
Qty: 0 | Refills: 0 | DISCHARGE

## 2024-09-17 RX ADMIN — Medication 100 MILLILITER(S): at 06:49

## 2024-09-17 RX ADMIN — SODIUM CHLORIDE 3 MILLILITER(S): 9 INJECTION INTRAMUSCULAR; INTRAVENOUS; SUBCUTANEOUS at 06:55

## 2024-09-17 NOTE — ASU PREOP CHECKLIST - AS BP NONINV SITE
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  Marked improvement with IVIg pre-thymectomy   02/2020 increased symptoms.  - reviewed MG treatment options and likelihood of improvement within two years following thymectomy  - additional treatment for B1 thymoma per Dr. Martini  - continue mycophenolate 2000  - continue pyridostigmine 60 qid  - CBC and CMP q 3 months  - continue prednisone 30 mg daily. Will discuss decreasing at next visit (4 weeks).  - Continue Bactrim prophylactically three times a week.   - IVIg 0.4 g/kg (20g) q 4 weeks x 6 doses. If symptoms worsen will give full course (40 GM q day x 3) and then once monthly.     - Continue Vit D and Calcium 600 mg daily. Plan to have bone scan in 6-12 months.      right upper arm

## 2024-09-17 NOTE — BRIEF OPERATIVE NOTE - OPERATION/FINDINGS
fluid deficit 350mL   exam under anesthesia revealed normal vagina and cervix   adnexa nonpalpable b/l  ostia visualized b/l, wnl   1 pedunculated polyp visualized on R lateral wall, partially calcified, resected   otherwise normal appearing endometrium fluid deficit 350mL   exam under anesthesia revealed normal vagina and cervix   adnexa nonpalpable b/l  ostia visualized b/l, wnl   1 pedunculated polyp visualized on fundus, partially calcified, resected   otherwise normal appearing endometrium

## 2024-09-17 NOTE — BRIEF OPERATIVE NOTE - NSICDXBRIEFPOSTOP_GEN_ALL_CORE_FT
POST-OP DIAGNOSIS:  Heavy menstrual bleeding 17-Sep-2024 09:22:24  Sacks, Ashley  Endometrial polyp 17-Sep-2024 09:22:36  Sacks, Ashley

## 2024-09-17 NOTE — ASU PATIENT PROFILE, ADULT - FALL HARM RISK - UNIVERSAL INTERVENTIONS
Bed in lowest position, wheels locked, appropriate side rails in place/Call bell, personal items and telephone in reach/Instruct patient to call for assistance before getting out of bed or chair/Non-slip footwear when patient is out of bed/[place] to call system/Physically safe environment - no spills, clutter or unnecessary equipment/Purposeful Proactive Rounding/Room/bathroom lighting operational, light cord in reach

## 2024-09-17 NOTE — ASU DISCHARGE PLAN (ADULT/PEDIATRIC) - ASU DC SPECIAL INSTRUCTIONSFT
Expect abdominal cramping/pain and spotting for the next weeks. Take ibuprofen and Tylenol for cramping. Use a pad as needed. Call your physician or go to the emergency room if you experience any of the following: heavy vaginal bleeding (soaking more than 2 pads in 1 hour for 2 hours), fever, chills, nausea, vomiting, or pain that is not controlled by medication. Follow-up with Dr. Malcolm in 2 weeks.

## 2024-09-17 NOTE — ASU DISCHARGE PLAN (ADULT/PEDIATRIC) - CARE PROVIDER_API CALL
Priscilla Malcolm  Obstetrics and Gynecology  [address] [place] [address], [address]  [place] [address]  Phone: [phone]  Fax: [phone]  Follow Up Time: 2 weeks

## 2024-09-17 NOTE — BRIEF OPERATIVE NOTE - NSICDXBRIEFPROCEDURE_GEN_ALL_CORE_FT
PROCEDURES:  Hysteroscopy, with polypectomy, cervical dilation, and curettage procedure 17-Sep-2024 09:20:59  Sacks, Ashley

## 2024-09-17 NOTE — ASU DISCHARGE PLAN (ADULT/PEDIATRIC) - NS MD DC FALL RISK RISK
For information on Fall & Injury Prevention, visit: [email or web address] OR  [email or web address] OR  https://www.cdc.gov/steadi/patient.html

## 2024-09-17 NOTE — ASU PATIENT PROFILE, ADULT - NSICDXPASTMEDICALHX_GEN_ALL_CORE_FT
PAST MEDICAL HISTORY:  Acid reflux     Gestational diabetes     History of hepatitis B     Incisional hernia     Obesity (BMI 30-39.9)     Umbilical hernia

## 2024-09-17 NOTE — ASU DISCHARGE PLAN (ADULT/PEDIATRIC) - PROCEDURE
Engagement attempt: Contacted patient to inform them that the office is currently closed to in person sessions and to offer an appointment via video or telephone. Patient stated that she feels that she's in a good place, her mood has been stable, and that she feels that she can discontinue psychotherapy at this time. Discussed the discharge process and how to re-engage in services in the future if needed. Patient was agreeable and expressed understanding.   
D&C hysteroscopy with polypectomy

## 2024-09-17 NOTE — ASU DISCHARGE PLAN (ADULT/PEDIATRIC) - NURSING INSTRUCTIONS
You can take Tylenol (Acetaminophen) every 6 hours and Motrin (Ibuprofen) every 6 hours, as needed. You may alternate these medications such that you take one or the other every 3 hours for around the clock pain coverage. Do not exceed 4000mg of Tylenol (Acetaminophen) daily. The next time you can take Tylenol and/or Ibuprofen is at 3PM, if needed for pain and no contraindications.

## 2024-09-20 LAB — SURGICAL PATHOLOGY STUDY: SIGNIFICANT CHANGE UP

## 2024-10-02 ENCOUNTER — APPOINTMENT (OUTPATIENT)
Dept: OBGYN | Facility: CLINIC | Age: 39
End: 2024-10-02
Payer: MEDICAID

## 2024-10-02 VITALS — DIASTOLIC BLOOD PRESSURE: 88 MMHG | SYSTOLIC BLOOD PRESSURE: 139 MMHG

## 2024-10-02 PROCEDURE — 99024 POSTOP FOLLOW-UP VISIT: CPT

## 2024-10-02 NOTE — ASSESSMENT
[Doing Well] : is doing well [Excellent Pain Control] : has excellent pain control [No Sign of Infection] : is showing no signs of infection [de-identified] : 39 year old presents for a post-op visit, s/p hysteroscopy with polypectomy, cervical dilation and curettage on 09/17/2024. Stable.

## 2024-10-02 NOTE — END OF VISIT
[FreeTextEntry3] : I, Irene Grant, acted as a scribe on behalf of Dr. Priscilla Malcolm M.D. on 10/02/2024.   All medical entries made by the scribe were at my, Dr. Priscilla Malcolm M.D., direction and personally dictated by me on 10/02/2024. I have reviewed the chart and agree that the record accurately reflects my personal performance of the history, physical exam, assessment and plan. I have also personally directed, reviewed, and agreed with the chart.

## 2024-10-02 NOTE — PLAN
[FreeTextEntry1] : 39 year old presents for a post-op visit, s/p hysteroscopy with polypectomy, cervical dilation and curettage on 09/17/2024. Stable.  -Pt cleared for all normal activities

## 2024-10-02 NOTE — HISTORY OF PRESENT ILLNESS
[Pain is well-controlled] : pain is well-controlled [Pathology reviewed] : pathology reviewed [Fever] : no fever [Chills] : no chills [Nausea] : no nausea [Vomiting] : no vomiting [de-identified] : 09/17/2024 [de-identified] : Hysteroscopy with polypectomy, cervical dilation and curettage. [de-identified] : 39 year old presents for a post-op visit, s/p hysteroscopy with polypectomy, cervical dilation and curettage on 09/17/2024. She was discharged on POD 0. She is doing well and has no complaints.  Pathology: Specimen(s) Submitted 1  Endometrial polyp 2  Endometrial curettings  Final Diagnosis 1. Endometrium, polyp excision - Endometrial polyp  2. Endometrium, curettings - Fragments of proliferative endometrium  Operative findings: Fluid deficit 350 mL.   Exam under anesthesia revealed normal vagina and cervix.  Adnexa nonpalpable bilaterally.  Ostia visualized bilaterally and within normal limits.  One pedunculated polyp visualized on fundus, partially calcified, otherwise normal-appearing endometrial cavity.    After informed consent was obtained, the patient was taken to the operating room where anesthesia was induced and found to be adequate.  The patient was placed in dorsal lithotomy position and prepped and draped in the usual sterile fashion.  Exam under  anesthesia revealed normal-appearing vagina and cervix.  Adnexa nonpalpable bilaterally.  Chin retractor was placed in the posterior fornix of the vagina.  The anterior lip of the cervix was grasped with a Rivera tenaculum.  The cervix was gently dilated  to 6.5 mm through serial insertion of Hegar dilators.  A 6.5 mm hysteroscope was inserted into the uterus to the fundus under infusion of normal saline.  The cavity was visualized and the ostia were located within normal limits bilaterally.  The uterus was noted to have 1 pedunculated polyp that was visualized on the fundus.  Polyp was partially calcified.  Polyp was removed with the hysteroscopic grasper.  The hysteroscope was then removed.  Sharp curettage was performed with a Chin curette until a gritty texture was noted.  Curettings were placed on a Telfa pad to be sent to pathology.  The tenaculum was removed with good hemostasis noted.  The patient tolerated the procedure well, was taken to the recovery room in stable condition.  Sponge count was correct x2.  Pathology was endometrial curettings, polyps were sent.

## 2025-03-20 NOTE — H&P PST ADULT - NEGATIVE GASTROINTESTINAL SYMPTOMS
Received message from insurance verification team that patient does not have bariatric benefit coverage. Attempted to call patient to review as he is scheduled for upcoming appointment. Unable to reach at this time. Left  requesting return call to clinic and contact information provided.     Patient returned call. Reviewed that she currently does not have bariatric benefits. She verbalized understanding. Confirmed that upcoming appointment with Dr. Chilel can be cancelled.   
no diarrhea/no vomiting/no nausea

## 2025-05-30 NOTE — OB PST NOTE - NS_GBS_INFANT_INVASIVE_OBGYN_ALL_OB_FT
Music Therapy Note    Therapy Session  Referral Type: New referral this admission  Visit Type: New visit  Session Start Time: 1132  Conflict of Service: Asleep  Number of family members present: 0  Number of staff members present: 0       Narrative  Assessment Detail: At the time of assessment pt was asleep with no family present at bedside.  Follow-up: MT will follow up with pt as able.    Education Documentation  No documentation found.           Patient states no history

## 2025-06-20 ENCOUNTER — APPOINTMENT (OUTPATIENT)
Dept: OBGYN | Facility: CLINIC | Age: 40
End: 2025-06-20
Payer: COMMERCIAL

## 2025-06-20 VITALS
SYSTOLIC BLOOD PRESSURE: 130 MMHG | BODY MASS INDEX: 33.99 KG/M2 | HEIGHT: 65 IN | DIASTOLIC BLOOD PRESSURE: 85 MMHG | WEIGHT: 204 LBS | HEART RATE: 88 BPM

## 2025-06-20 PROBLEM — N91.2 AMENORRHEA, UNSPECIFIED: Status: ACTIVE | Noted: 2025-06-20

## 2025-06-20 PROCEDURE — 36415 COLL VENOUS BLD VENIPUNCTURE: CPT

## 2025-06-20 PROCEDURE — 76817 TRANSVAGINAL US OBSTETRIC: CPT

## 2025-06-20 PROCEDURE — 99213 OFFICE O/P EST LOW 20 MIN: CPT | Mod: 25

## 2025-06-22 LAB
BASOPHILS # BLD AUTO: 0.05 K/UL
BASOPHILS NFR BLD AUTO: 0.7 %
C TRACH RRNA SPEC QL NAA+PROBE: NOT DETECTED
EOSINOPHIL # BLD AUTO: 0.1 K/UL
EOSINOPHIL NFR BLD AUTO: 1.3 %
HCT VFR BLD CALC: 39.7 %
HCV AB SER QL: NONREACTIVE
HCV S/CO RATIO: 0.22 S/CO
HGB BLD-MCNC: 12.5 G/DL
HIV1+2 AB SPEC QL IA.RAPID: NONREACTIVE
HPV HIGH+LOW RISK DNA PNL CVX: NOT DETECTED
IMM GRANULOCYTES NFR BLD AUTO: 0.3 %
LEAD BLD-MCNC: <1 UG/DL
LYMPHOCYTES # BLD AUTO: 2.3 K/UL
LYMPHOCYTES NFR BLD AUTO: 30.2 %
MAN DIFF?: NORMAL
MCHC RBC-ENTMCNC: 28.4 PG
MCHC RBC-ENTMCNC: 31.5 G/DL
MCV RBC AUTO: 90.2 FL
MEV IGG FLD QL IA: 60.2 AU/ML
MEV IGG+IGM SER-IMP: POSITIVE
MONOCYTES # BLD AUTO: 0.4 K/UL
MONOCYTES NFR BLD AUTO: 5.3 %
MUV AB SER-ACNC: POSITIVE
MUV IGG SER QL IA: 96.5 AU/ML
N GONORRHOEA RRNA SPEC QL NAA+PROBE: NOT DETECTED
NEUTROPHILS # BLD AUTO: 4.74 K/UL
NEUTROPHILS NFR BLD AUTO: 62.2 %
PLATELET # BLD AUTO: 257 K/UL
RBC # BLD: 4.4 M/UL
RBC # FLD: 14 %
RUBV IGG FLD-ACNC: 3.86 INDEX
RUBV IGG FLD-ACNC: 3.86 INDEX
RUBV IGG SER-IMP: POSITIVE
RUBV IGG SER-IMP: POSITIVE
SOURCE AMPLIFICATION: NORMAL
T PALLIDUM AB SER QL IA: NEGATIVE
TSH SERPL-ACNC: 2.3 UIU/ML
VZV AB TITR SER: POSITIVE
VZV IGG SER IF-ACNC: 6.28 S/CO
WBC # FLD AUTO: 7.61 K/UL

## 2025-06-23 LAB
25(OH)D3 SERPL-MCNC: 19 NG/ML
ABORH: NORMAL
ANTIBODY SCREEN: NORMAL
B19V IGG SER QL IA: 7.57 INDEX
B19V IGG+IGM SER-IMP: NORMAL
B19V IGG+IGM SER-IMP: POSITIVE
B19V IGM FLD-ACNC: 0.14 INDEX
B19V IGM SER-ACNC: NEGATIVE
ESTIMATED AVERAGE GLUCOSE: 117 MG/DL
HBA1C MFR BLD HPLC: 5.7 %
HBV CORE IGG+IGM SER QL: REACTIVE
HBV SURFACE AB SER QL: NONREACTIVE
HBV SURFACE AG SER QL: REACTIVE
HGB A MFR BLD: 97.1 %
HGB A2 MFR BLD: 2.9 %
HGB FRACT BLD-IMP: NORMAL

## 2025-06-24 PROBLEM — N39.0 ACUTE UTI: Status: ACTIVE | Noted: 2025-06-24 | Resolved: 2025-07-24

## 2025-06-24 PROBLEM — R73.09 ELEVATED HEMOGLOBIN A1C: Status: ACTIVE | Noted: 2025-06-24

## 2025-06-24 LAB
BACTERIA UR CULT: ABNORMAL
CYTOLOGY CVX/VAG DOC THIN PREP: ABNORMAL

## 2025-06-25 RX ORDER — AMPICILLIN 500 MG/1
500 CAPSULE ORAL
Qty: 15 | Refills: 0 | Status: ACTIVE | COMMUNITY
Start: 2025-06-24 | End: 1900-01-01

## 2025-07-02 PROBLEM — B37.9 YEAST INFECTION: Status: ACTIVE | Noted: 2025-07-02

## 2025-07-02 RX ORDER — FLUCONAZOLE 150 MG/1
150 TABLET ORAL
Qty: 2 | Refills: 0 | Status: ACTIVE | COMMUNITY
Start: 2025-07-02 | End: 1900-01-01

## 2025-07-16 ENCOUNTER — APPOINTMENT (OUTPATIENT)
Dept: OBGYN | Facility: CLINIC | Age: 40
End: 2025-07-16

## 2025-07-21 ENCOUNTER — APPOINTMENT (OUTPATIENT)
Dept: OBGYN | Facility: CLINIC | Age: 40
End: 2025-07-21
